# Patient Record
Sex: FEMALE | Race: WHITE
[De-identification: names, ages, dates, MRNs, and addresses within clinical notes are randomized per-mention and may not be internally consistent; named-entity substitution may affect disease eponyms.]

---

## 2018-10-16 ENCOUNTER — HOSPITAL ENCOUNTER (INPATIENT)
Dept: HOSPITAL 75 - ER | Age: 73
LOS: 1 days | Discharge: HOME | DRG: 310 | End: 2018-10-17
Attending: FAMILY MEDICINE | Admitting: FAMILY MEDICINE
Payer: MEDICARE

## 2018-10-16 VITALS — DIASTOLIC BLOOD PRESSURE: 93 MMHG | SYSTOLIC BLOOD PRESSURE: 127 MMHG

## 2018-10-16 VITALS — SYSTOLIC BLOOD PRESSURE: 134 MMHG | DIASTOLIC BLOOD PRESSURE: 95 MMHG

## 2018-10-16 VITALS — DIASTOLIC BLOOD PRESSURE: 59 MMHG | SYSTOLIC BLOOD PRESSURE: 110 MMHG

## 2018-10-16 VITALS — DIASTOLIC BLOOD PRESSURE: 93 MMHG | SYSTOLIC BLOOD PRESSURE: 125 MMHG

## 2018-10-16 VITALS — SYSTOLIC BLOOD PRESSURE: 127 MMHG | DIASTOLIC BLOOD PRESSURE: 80 MMHG

## 2018-10-16 VITALS — DIASTOLIC BLOOD PRESSURE: 82 MMHG | SYSTOLIC BLOOD PRESSURE: 120 MMHG

## 2018-10-16 VITALS — DIASTOLIC BLOOD PRESSURE: 69 MMHG | SYSTOLIC BLOOD PRESSURE: 109 MMHG

## 2018-10-16 VITALS — DIASTOLIC BLOOD PRESSURE: 96 MMHG | SYSTOLIC BLOOD PRESSURE: 135 MMHG

## 2018-10-16 VITALS — DIASTOLIC BLOOD PRESSURE: 86 MMHG | SYSTOLIC BLOOD PRESSURE: 122 MMHG

## 2018-10-16 VITALS — SYSTOLIC BLOOD PRESSURE: 119 MMHG | DIASTOLIC BLOOD PRESSURE: 74 MMHG

## 2018-10-16 VITALS — WEIGHT: 189.2 LBS | HEIGHT: 64 IN | BODY MASS INDEX: 32.3 KG/M2

## 2018-10-16 DIAGNOSIS — Z23: ICD-10-CM

## 2018-10-16 DIAGNOSIS — I48.0: Primary | ICD-10-CM

## 2018-10-16 DIAGNOSIS — Z82.49: ICD-10-CM

## 2018-10-16 DIAGNOSIS — R07.89: ICD-10-CM

## 2018-10-16 LAB
ALBUMIN SERPL-MCNC: 3.7 GM/DL (ref 3.2–4.5)
ALP SERPL-CCNC: 86 U/L (ref 40–136)
ALT SERPL-CCNC: 17 U/L (ref 0–55)
APTT BLD: 29 SEC (ref 24–35)
BASOPHILS # BLD AUTO: 0 10^3/UL (ref 0–0.1)
BASOPHILS NFR BLD AUTO: 0 % (ref 0–10)
BILIRUB SERPL-MCNC: 1 MG/DL (ref 0.1–1)
BUN/CREAT SERPL: 32
CALCIUM SERPL-MCNC: 9.6 MG/DL (ref 8.5–10.1)
CHLORIDE SERPL-SCNC: 112 MMOL/L (ref 98–107)
CO2 SERPL-SCNC: 18 MMOL/L (ref 21–32)
CREAT SERPL-MCNC: 0.78 MG/DL (ref 0.6–1.3)
EOSINOPHIL # BLD AUTO: 0.1 10^3/UL (ref 0–0.3)
EOSINOPHIL NFR BLD AUTO: 1 % (ref 0–10)
ERYTHROCYTE [DISTWIDTH] IN BLOOD BY AUTOMATED COUNT: 13.6 % (ref 10–14.5)
GFR SERPLBLD BASED ON 1.73 SQ M-ARVRAT: > 60 ML/MIN
GLUCOSE SERPL-MCNC: 107 MG/DL (ref 70–105)
HCT VFR BLD CALC: 40 % (ref 35–52)
HGB BLD-MCNC: 14 G/DL (ref 11.5–16)
INR PPP: 1 (ref 0.8–1.4)
LYMPHOCYTES # BLD AUTO: 2.1 X 10^3 (ref 1–4)
LYMPHOCYTES NFR BLD AUTO: 27 % (ref 12–44)
MAGNESIUM SERPL-MCNC: 2 MG/DL (ref 1.8–2.4)
MANUAL DIFFERENTIAL PERFORMED BLD QL: NO
MCH RBC QN AUTO: 32 PG (ref 25–34)
MCHC RBC AUTO-ENTMCNC: 35 G/DL (ref 32–36)
MCV RBC AUTO: 94 FL (ref 80–99)
MONOCYTES # BLD AUTO: 0.5 X 10^3 (ref 0–1)
MONOCYTES NFR BLD AUTO: 7 % (ref 0–12)
MYOGLOBIN SERPL-MCNC: 49 NG/ML (ref 10–92)
NEUTROPHILS # BLD AUTO: 4.8 X 10^3 (ref 1.8–7.8)
NEUTROPHILS NFR BLD AUTO: 64 % (ref 42–75)
PLATELET # BLD: 277 10^3/UL (ref 130–400)
PMV BLD AUTO: 9 FL (ref 7.4–10.4)
POTASSIUM SERPL-SCNC: 4 MMOL/L (ref 3.6–5)
PROT SERPL-MCNC: 6.4 GM/DL (ref 6.4–8.2)
PROTHROMBIN TIME: 13.5 SEC (ref 12.2–14.7)
RBC # BLD AUTO: 4.31 10^6/UL (ref 4.35–5.85)
SODIUM SERPL-SCNC: 143 MMOL/L (ref 135–145)
WBC # BLD AUTO: 7.5 10^3/UL (ref 4.3–11)

## 2018-10-16 PROCEDURE — 83735 ASSAY OF MAGNESIUM: CPT

## 2018-10-16 PROCEDURE — 93041 RHYTHM ECG TRACING: CPT

## 2018-10-16 PROCEDURE — 87081 CULTURE SCREEN ONLY: CPT

## 2018-10-16 PROCEDURE — 71045 X-RAY EXAM CHEST 1 VIEW: CPT

## 2018-10-16 PROCEDURE — 36415 COLL VENOUS BLD VENIPUNCTURE: CPT

## 2018-10-16 PROCEDURE — 96366 THER/PROPH/DIAG IV INF ADDON: CPT

## 2018-10-16 PROCEDURE — 93306 TTE W/DOPPLER COMPLETE: CPT

## 2018-10-16 PROCEDURE — 83874 ASSAY OF MYOGLOBIN: CPT

## 2018-10-16 PROCEDURE — 85610 PROTHROMBIN TIME: CPT

## 2018-10-16 PROCEDURE — 85025 COMPLETE CBC W/AUTO DIFF WBC: CPT

## 2018-10-16 PROCEDURE — 96365 THER/PROPH/DIAG IV INF INIT: CPT

## 2018-10-16 PROCEDURE — 93005 ELECTROCARDIOGRAM TRACING: CPT

## 2018-10-16 PROCEDURE — 90686 IIV4 VACC NO PRSV 0.5 ML IM: CPT

## 2018-10-16 PROCEDURE — 84484 ASSAY OF TROPONIN QUANT: CPT

## 2018-10-16 PROCEDURE — 85730 THROMBOPLASTIN TIME PARTIAL: CPT

## 2018-10-16 PROCEDURE — 84443 ASSAY THYROID STIM HORMONE: CPT

## 2018-10-16 PROCEDURE — 80061 LIPID PANEL: CPT

## 2018-10-16 PROCEDURE — 80053 COMPREHEN METABOLIC PANEL: CPT

## 2018-10-16 RX ADMIN — Medication SCH ML: at 20:42

## 2018-10-16 RX ADMIN — APIXABAN SCH MG: 5 TABLET, FILM COATED ORAL at 20:40

## 2018-10-16 RX ADMIN — Medication SCH ML: at 15:40

## 2018-10-16 NOTE — XMS REPORT
[facility]

 Created on: 2017



Aledo, Funmi

External Reference #: [id number]

: 1945

Sex: Female



Demographics







 Address  [address] [place]  [address]

 

 Preferred Language  Unknown

 

 Marital Status  Unknown

 

 [employer] Affiliation  Unknown

 

 Race  Unknown

 

 Ethnic Group  Unknown





Author







 Author  PERLITA IBARRA

 

 [facility]

 

 Address  [address] [place] [address]  [address]



 

 Phone  Unavailable







Care Team Providers







 Care Team Member Name  Role  Phone

 

 PERLITA IBARRA  Unavailable  Unavailable







PROBLEMS







 Type  Condition  ICD9-CM Code  [id number] Code  Onset Dates  Condition Status  
SNOMED Code

 

 Problem  Other and unspecified noninfectious gastroenteritis and colitis  
558.9        Active  27421134

 

 Problem  Unspecified conjunctivitis  372.30        Active  7736166

 

 Problem  Redness or discharge of eye  379.93        Active  47941673

 

 Assessment  Dental examination     Z01.20  24 Aug, 2016  Active  [id number]

 

 Problem  Nausea with vomiting  787.01        Active  61830134

 

 Problem  Blood in stool  578.1        Active  900069853

 

 Problem  Urinary tract infection, site not specified  599.0        Active  
67493888

 

 Problem  Cough  786.2        Active  75519029

 

 Problem  Personal history of colonic polyps  V12.72        Active  365494971

 

 Problem  Family history of malignant neoplasm of gastrointestinal tract  V16.0
        Active  914402811

 

 Problem  Elevated blood pressure reading without diagnosis of hypertension  
796.2        Active  834025930

 

 Problem  Actinic keratosis  702.0        Active  [id number]







ALLERGIES







 Substance  Reaction  Event Type  Date  Status

 

 N.K.D.A.  Unknown  Non Drug Allergy  24 Aug, 2016  Unknown







SOCIAL HISTORY

No smoking Hx information available



PLAN OF CARE





VITAL SIGNS







 Blood pressure systolic  145 mmHg  2016

 

 Blood pressure diastolic  85 mmHg  2016







MEDICATIONS

Unknown Medications



RESULTS

No Results



PROCEDURES







 Procedure  Date Ordered  Related Diagnosis  Body Site

 

 COMP ORAL EVALUATION - NEW/EST PT  Aug 24, 2016      

 

 INTRAORL-PERIAPICAL 1 FILM 62458  Aug 24, 2016      

 

 TOPICAL FLUORIDE VARNISH  Aug 24, 2016      

 

 PROPHYLAXIS - ADULT  Aug 24, 2016      

 

 INTRAORL-PERIAPICAL EA ADD FILM  Aug 24, 2016      

 

 INTRAORL-PERIAPICAL EA ADD FILM  Aug 24, 2016      

 

 PANORAMIC FILM SEE ALSO CODE 35265  Aug 24, 2016      

 

 BITEWINGS - FOUR FILMS  Aug 24, 2016      







IMMUNIZATIONS

No Known Immunizations

## 2018-10-16 NOTE — CONSULTATION-CARDIOLOGY
HPI-Cardiology


Cardiology Consultation:


Date of Consultation


10/16/18


Time Seen by a Provider:  12:30


Date of Admission


10-16-18


Attending Physician


Fifi River MD


Admitting Physician


No,Local Physician


Consulting Physician


Georgina Johnson MD





HPI:


Chief Complaint:


New onset a-fib with RVR


Ms. Loja is a 73 year old female admitted to ICU 10 from the ED with new 

onset a-fib with RVR





Review of Systems-Cardiology


Review of Systems


Constitutional:  No chills, No fever; tiredness


Eyes:  No vision change


Ears/Nose/Throat:  No recent hearing loss


Respiratory:  As described under HPI


Cardiovascular:  As described under HPI


Gastrointestinal:  abdomen distended; No constipation, No diarrhea; nausea; No 

vomiting


Genitourinary:  No dysuria, No hematuria


Skin:  No rash, No ulcerations


Psychiatric/Neurological:  No anxiety, No depression, No seizure, No focal 

weakness, No syncope


Hematologic:  No bleeding abnormalities





PMH-Social-Family Hx


Patient Social History


Alcohol Use:  Denies Use


Recreational Drug Use:  No


Smoking Status:  Never a Smoker


Recent Foreign Travel:  No


Recent Infectious Disease Expo:  No


Hospitalization with Isolation:  Denies





Past Medical History


PMH


As described under Assessment.





Allergies and Home Medications


Allergies


Coded Allergies:  


     No Known Drug Allergies (Unverified , 10/16/18)





Home Medications


Aspirin/Caffeine 1 Each Tablet, 2 TAB PO DAILY, (Reported)





Physical Exam-Cardiology


Physical Exam


Vital Signs/I&O











 10/16/18 10/16/18 10/16/18 10/16/18





 09:10 09:15 09:50 10:02


 


Pulse    113


 


Resp    18


 


B/P (MAP)   122/93 135/96 (109)


 


Pulse Ox    98


 


O2 Delivery  Room Air  Room Air





 10/16/18 10/16/18 10/16/18 10/16/18





 10:54 12:10 12:30 12:30


 


Temp    98.2


 


Pulse 100 93  103


 


Resp 18 18  14


 


B/P (MAP) 134/95 (108) 125/75 (92)  127/80 (96)


 


Pulse Ox 97 96  98


 


O2 Delivery Room Air Room Air Room Air Room Air


 


    





 10/16/18 10/16/18  





 13:00 13:00  


 


Pulse 92 105  


 


Resp  8  


 


B/P (MAP)  122/86 (98)  


 


Pulse Ox  98  


 


O2 Delivery  Room Air  





Capillary Refill : Less Than 3 Seconds





Data Review


Labs


Laboratory Tests


10/16/18 09:30: 


White Blood Count 7.5, Red Blood Count 4.31L, Hemoglobin 14.0, Hematocrit 40, 

Mean Corpuscular Volume 94, Mean Corpuscular Hemoglobin 32, Mean Corpuscular 

Hemoglobin Concent 35, Red Cell Distribution Width 13.6, Platelet Count 277, 

Mean Platelet Volume 9.0, Neutrophils (%) (Auto) 64, Lymphocytes (%) (Auto) 27, 

Monocytes (%) (Auto) 7, Eosinophils (%) (Auto) 1, Basophils (%) (Auto) 0, 

Neutrophils # (Auto) 4.8, Lymphocytes # (Auto) 2.1, Monocytes # (Auto) 0.5, 

Eosinophils # (Auto) 0.1, Basophils # (Auto) 0.0, Prothrombin Time 13.5, INR 

Comment 1.0, Activated Partial Thromboplast Time 29, Sodium Level 143, 

Potassium Level 4.0, Chloride Level 112H, Carbon Dioxide Level 18L, Anion Gap 13

, Blood Urea Nitrogen 25H, Creatinine 0.78, Estimat Glomerular Filtration Rate 

> 60, BUN/Creatinine Ratio 32, Glucose Level 107H, Calcium Level 9.6, Corrected 

Calcium 9.8, Magnesium Level 2.0, Total Bilirubin 1.0, Aspartate Amino Transf (

AST/SGOT) 17, Alanine Aminotransferase (ALT/SGPT) 17, Alkaline Phosphatase 86, 

Myoglobin 49.0, Troponin I < 0.30, Total Protein 6.4, Albumin 3.7, TSH Cascade 

Testing 1.55








Radiology


NAME:   MEHDI LOAJ


MED REC#:   [id number]


ACCOUNT#:   [id number]


PT STATUS:   REG ER


:   1945


PHYSICIAN:   CORDELIA GARCIA MD


ADMIT DATE:   10/16/18/ER


***Draft***


Date of Exam:10/16/18





CHEST 1 VIEW, AP/PA ONLY








Portable erect AP chest at 1019 hours.





INDICATION: Chest pain, shortness of breath.





There are no prior studies available for comparison.





FINDINGS: The heart size is at the upper limits of normal or


slightly enlarged. The lungs are clear. There is no sign of


failure, pneumonia or pleural effusion to indicate an acute


abnormality. The mediastinum is not widened but the superior


mediastinum on the right is somewhat prominent. This is more


likely due to vascular ectasia than to a mediastinal mass. If


previous exams are available, they would be helpful for


comparison. If there are no prior studies available, however,


then CT of the chest would be recommended.





The osseous structures are intact.





IMPRESSION:


1. There is borderline cardiomegaly, but there is no evidence for


an acute cardiopulmonary abnormality.


2. The prominence of superior mediastinum on the right is more


likely due to vascular ectasia than to a mediastinal mass.


Recommendations as above. 





  Dictated on workstation # [id number]








Dict:   10/16/18 1028


Trans:   10/16/18 1033


 [id number]





Interpreted by:     BETZAIDA GUILLEN MD


Electronically signed by:





ECG Impression


ECG


Initial ECG Impression:  Atrial Fibrillation w/RVR





A/P-Cardiology


Assessment/Admission Diagnosis


New onset a-fib with RVR (first dx today)





Clinical Quality Measures


AMI/AHF:


ASA po Prior to arrival:  Yes (BY [facility] )











SHAHLA SPAULDING Oct 16, 2018 12:30

## 2018-10-16 NOTE — ED CHEST PAIN
General


Chief Complaint:  Chest Pain


Stated Complaint:  CP


Nursing Triage Note:  


TO ED PER EMS FROM [facility] WITH CHEST PAIN AND SOA ON ADMIT MONITOR SHOW A FIB WITH 


RVR WITH - 160"S. PATIENT REPORTS  THAT HAS FELT SOA WITH CHEST PAIN ON 


AND OFF FOR LAST 2 DAYS


Nursing Sepsis Screen:  No Definite Risk


Source:  patient, EMS, other ([facility])


Exam Limitations:  no limitations





History of Present Illness


Date Seen by Provider:  Oct 16, 2018


Time Seen by Provider:  09:11


Initial Comments


This 73-year-old woman presents to emergency room with complaints of chest 

pain.  She arrives via EMS from [facility] clinic where she received aspirin 324 mg 

and a dose of nitroglycerin.  The nitroglycerin did not help.  EKG performed in 

the clinic showed atrial fibrillation with a normal rate.  She is in RVR for 

EMS.  Pain has improved since arriving to the hospital.  She has no prior 

history of atrial fibrillation or any cardiac history.  She woke this morning 

around 01:00 with the symptoms.  She felt particularly lightheaded and dizzy.  

She had pain in the sternal area and radiating up to the left jaw.  She recalls 

having a similar episode that was brief the night prior.





Allergies and Home Medications


Allergies


Coded Allergies:  


     No Known Drug Allergies (Unverified , 10/16/18)





Home Medications


Aspirin/Caffeine 1 Each Tablet, 2 TAB PO DAILY, (Reported)





Patient Home Medication List


Home Medication List Reviewed:  Yes





Review of Systems


Review of Systems


Constitutional:  no symptoms reported


EENTM:  No Symptoms Reported


Respiratory:  No Symptoms Reported


Cardiovascular:  See HPI


Gastrointestinal:  No Symptoms Reported


Genitourinary:  No Symptoms Reported


Musculoskeletal:  no symptoms reported


Skin:  no symptoms reported


Psychiatric/Neurological:  No Symptoms Reported


Endocrine:  No Symptoms Reported


Hematologic/Lymphatic:  No Symptoms Reported





Past Medical-Social-Family Hx


Past Med/Social Hx:  Reviewed and Corrections made


Patient Social History


Alcohol Use:  Denies Use


Recreational Drug Use:  No


Smoking Status:  Never a Smoker


Recent Foreign Travel:  No


Contact w/Someone Who Travel:  No


Recent Infectious Disease Expo:  No





Past Medical History


Surgeries:  Yes (anterior-posterior pelvic repair)


Hysterectomy


Respiratory:  No


Cardiac:  No


Neurological:  No


GYN History:  Hysterectomy


Genitourinary:  No


Gastrointestinal:  No


Musculoskeletal:  No


Endocrine:  No


HEENT:  No


Cancer:  No


Psychosocial:  No


Integumentary:  No





Physical Exam


Vital Signs





Vital Signs - First Documented








 10/16/18 10/16/18 10/16/18





 09:15 09:50 10:02


 


Pulse   113


 


Resp   18


 


B/P (MAP)  122/93 


 


Pulse Ox   98


 


O2 Delivery Room Air  





Capillary Refill : Less Than 3 Seconds


Height, Weight, BMI


Height: 5'4.00"


Weight: 200lbs. oz. 90.265614ce;  BMI


Method:Stated


General Appearance:  No Apparent Distress, WD/WN


HEENT:  PERRL/EOMI, Normal ENT Inspection, Pharynx Normal


Neck:  Normal Inspection


Respiratory:  Lungs Clear, Normal Breath Sounds, No Accessory Muscle Use, No 

Respiratory Distress


Cardiovascular:  No Edema, No Murmur, Irregularly Irregular, Tachycardia


Gastrointestinal:  Normal Bowel Sounds, Non Tender, Soft


Extremity:  Normal Inspection, Non Tender, No Pedal Edema


Neurologic/Psychiatric:  Alert, Oriented x3, No Motor/Sensory Deficits, Normal 

Mood/Affect, CNs II-XII Norm as Tested


Skin:  Normal Color, Warm/Dry





Progress/Results/Core Measures


Results/Orders


Lab Results





Laboratory Tests








Test


 10/16/18


09:30 Range/Units


 


 


White Blood Count


 7.5 


 4.3-11.0


10^3/uL


 


Red Blood Count


 4.31 L


 4.35-5.85


10^6/uL


 


Hemoglobin 14.0  11.5-16.0  G/DL


 


Hematocrit 40  35-52  %


 


Mean Corpuscular Volume 94  80-99  FL


 


Mean Corpuscular Hemoglobin 32  25-34  PG


 


Mean Corpuscular Hemoglobin


Concent 35 


 32-36  G/DL





 


Red Cell Distribution Width 13.6  10.0-14.5  %


 


Platelet Count


 277 


 130-400


10^3/uL


 


Mean Platelet Volume 9.0  7.4-10.4  FL


 


Neutrophils (%) (Auto) 64  42-75  %


 


Lymphocytes (%) (Auto) 27  12-44  %


 


Monocytes (%) (Auto) 7  0-12  %


 


Eosinophils (%) (Auto) 1  0-10  %


 


Basophils (%) (Auto) 0  0-10  %


 


Neutrophils # (Auto) 4.8  1.8-7.8  X 10^3


 


Lymphocytes # (Auto) 2.1  1.0-4.0  X 10^3


 


Monocytes # (Auto) 0.5  0.0-1.0  X 10^3


 


Eosinophils # (Auto)


 0.1 


 0.0-0.3


10^3/uL


 


Basophils # (Auto)


 0.0 


 0.0-0.1


10^3/uL


 


Prothrombin Time 13.5  12.2-14.7  SEC


 


INR Comment 1.0  0.8-1.4  


 


Activated Partial


Thromboplast Time 29 


 24-35  SEC





 


Sodium Level 143  135-145  MMOL/L


 


Potassium Level 4.0  3.6-5.0  MMOL/L


 


Chloride Level 112 H   MMOL/L


 


Carbon Dioxide Level 18 L 21-32  MMOL/L


 


Anion Gap 13  5-14  MMOL/L


 


Blood Urea Nitrogen 25 H 7-18  MG/DL


 


Creatinine


 0.78 


 0.60-1.30


MG/DL


 


Estimat Glomerular Filtration


Rate > 60 


  





 


BUN/Creatinine Ratio 32   


 


Glucose Level 107 H   MG/DL


 


Calcium Level 9.6  8.5-10.1  MG/DL


 


Corrected Calcium 9.8  8.5-10.1  MG/DL


 


Magnesium Level 2.0  1.8-2.4  MG/DL


 


Total Bilirubin 1.0  0.1-1.0  MG/DL


 


Aspartate Amino Transf


(AST/SGOT) 17 


 5-34  U/L





 


Alanine Aminotransferase


(ALT/SGPT) 17 


 0-55  U/L





 


Alkaline Phosphatase 86    U/L


 


Myoglobin


 49.0 


 10.0-92.0


NG/ML


 


Troponin I < 0.30  <0.30  NG/ML


 


Total Protein 6.4  6.4-8.2  GM/DL


 


Albumin 3.7  3.2-4.5  GM/DL


 


TSH Cascade Testing


 1.55 


 0.35-4.94


UIU/ML








My Orders





Orders - CORDELIA GARCIA MD


Ekg Tracing (10/16/18 09:11)


Cbc With Automated Diff (10/16/18 09:)


Magnesium (10/16/18 09:)


Chest 1 View, Ap/Pa Only (10/16/18 09:)


Cardiac Profile 1 (10/16/18 09:)


Comprehensive Metabolic Panel (10/16/18 09:)


Myoglobin Serum (10/16/18 09:)


Protime With Inr (10/16/18 09:)


Partial Thromboplastin Time (10/16/18 09:)


O2 (10/16/18 09:)


Monitor-Rhythm Ecg Trace Only (10/16/18 09:)


Lipid Panel (10/17/18 06:00)


Saline Lock/Iv-Start (10/16/18 09:17)


Thyroid Analyzer (10/16/18 09:17)


Diltiazem Injection (Cardizem Injection) (10/16/18 09:45)


Ns (Ivpb) (Sodium C... W/Diltiazem Iv Fo (10/16/18 09:45)


Apixaban Tablet (Eliquis Tablet) (10/16/18 11:45)





Medications Given in ED





Current Medications








 Medications  Dose


 Ordered  Sig/Ling


 Route  Start Time


 Stop Time Status Last Admin


Dose Admin


 


 Diltiazem HCl  10 mg  ONCE  ONCE


 IVP  10/16/18 09:45


 10/16/18 09:46 DC 10/16/18 09:39


10 MG








Vital Signs/I&O











 10/16/18 10/16/18 10/16/18 10/16/18





 09:10 09:15 09:50 10:02


 


Pulse    113


 


Resp    18


 


B/P (MAP)   122/93 135/96 (109)


 


Pulse Ox    98


 


O2 Delivery  Room Air  Room Air





 10/16/18   





 10:54   


 


Pulse 100   


 


Resp 18   


 


B/P (MAP) 134/95 (108)   


 


Pulse Ox 97   


 


O2 Delivery Room Air   














Blood Pressure Mean:  109











Progress


Progress Note :  


Progress Note


Patient had much improved vital signs on her Cardizem drip.  She was feeling 

much improved as well.  Workup was unremarkable.  Eliquis was initiated in the 

ER per Dr. Johnson's recommendation.


Initial ECG Impression Date:  Oct 16, 2018


Initial ECG Impression Time:  09:13


Initial ECG Rate:  174


Initial ECG Rhythm:  A Fib/Flutter


Initial ECG Impression:  Atrial Fibrillation w/RVR


Comment


Atrial fibrillation with repolarization abnormalities.





Diagnostic Imaging





   Diagonstic Imaging:  Xray


   Plain Films/CT/US/NM/MRI:  chest


Comments


Chest x-ray viewed by me and report reviewed.  See report below:





NAME:   MEHDI WAHL


MED REC#:   [id number]


ACCOUNT#:   [id number]


PT STATUS:   REG ER


:   1945


PHYSICIAN:   CORDELIA GARCIA MD


ADMIT DATE:   10/16/18/ER


***Draft***


Date of Exam:10/16/18





CHEST 1 VIEW, AP/PA ONLY





Portable erect AP chest at 1019 hours.





INDICATION: Chest pain, shortness of breath.





There are no prior studies available for comparison.





FINDINGS: The heart size is at the upper limits of normal or


slightly enlarged. The lungs are clear. There is no sign of


failure, pneumonia or pleural effusion to indicate an acute


abnormality. The mediastinum is not widened but the superior


mediastinum on the right is somewhat prominent. This is more


likely due to vascular ectasia than to a mediastinal mass. If


previous exams are available, they would be helpful for


comparison. If there are no prior studies available, however,


then CT of the chest would be recommended.





The osseous structures are intact.





IMPRESSION:


1. There is borderline cardiomegaly, but there is no evidence for


an acute cardiopulmonary abnormality.


2. The prominence of superior mediastinum on the right is more


likely due to vascular ectasia than to a mediastinal mass.


Recommendations as above. 





  Dictated on workstation # [id number]





Dict:   10/16/18 1028


Trans:   10/16/18 1033


 [id number]





Interpreted by:     BETZAIDA GUILLEN MD





Departure


Communication (Admissions)


Time/Spoke to Admitting Phy:  11:25


Dr. River


Time/Spoke to Consulting Phy:  11:18


Dr. Johnson





Impression





 Primary Impression:  


 Atrial fibrillation with RVR


 Additional Impressions:  


 New onset a-fib


 Chest pain


 Qualified Codes:  R07.9 - Chest pain, unspecified


Disposition:   ADMITTED AS INPATIENT


Condition:  Improved





Admissions


Decision to Admit Reason:  Admit from ER (General)


Decision to Admit/Date:  Oct 16, 2018


Time/Decision to Admit Time:  09:11











CORDELIA GARCIA MD Oct 16, 2018 10:18

## 2018-10-16 NOTE — XMS REPORT
Continuity of Care Document

 Created on: 10/16/2018



[facility]

External Reference #: [id number]

: 1945

Sex: Female



Demographics







 Address  [address]

[place]  [address]

 

 Home Phone  [phone] x

 

 Preferred Language  Unknown

 

 Marital Status  Unknown

 

 Cheondoism Affiliation  Unknown

 

 Race  Unknown

 

 Ethnic Group  Unknown





Author







 Author  [facility]

 

 Address  Unknown

 

 Phone  Unavailable



              



Allergies

      



There is no data.                  



Medications

      



There is no data.                  



Problems

      





 Date Dx Coded            Attending            Type            Code            
Diagnosis            Diagnosed By        

 

 2009                                      401.1            HYPERTENSION, 
BENIGN ESSENTIAL                     

 

 2009                                      401.1            HYPERTENSION, 
BENIGN ESSENTIAL                     

 

 2009            ADAM CRENSHAW DO K                         401.1          
  HYPERTENSION, BENIGN ESSENTIAL                     

 

 2009            ADAM CRENSHAW DO K                         401.1          
  HYPERTENSION, BENIGN ESSENTIAL                     

 

 2009                                      845.00            ANKLE SPRAIN
                     

 

 2009                                      845.00            ANKLE SPRAIN
                     

 

 2009            JANNA CRENSHAW DOA K                         845.00         
   ANKLE SPRAIN                     

 

 2009            JANNA CRENSHAW DOA K                         845.00         
   ANKLE SPRAIN                     

 

 2010                                      466.0            ACUTE 
BRONCHITIS                     

 

 2010                                      477.9            ALLERGIC 
RHINITIS                     

 

 2010                                      786.2            cough        
             

 

 2010                                      466.0            ACUTE 
BRONCHITIS                     

 

 2010                                      477.9            ALLERGIC 
RHINITIS                     

 

 2010                                      786.2            cough        
             

 

 2010            JANNA CRENSHAW DOA K                         466.0          
  ACUTE BRONCHITIS                     

 

 2010            FLOWER AMADOR ADAM K                         477.9          
  ALLERGIC RHINITIS                     

 

 2010            JANNA CRENSHAW DOA K                         786.2          
  cough                     

 

 2010            JANNA CRENSHAW DOA K                         466.0          
  ACUTE BRONCHITIS                     

 

 2010            FLOWER AMADOR ADAM K                         477.9          
  ALLERGIC RHINITIS                     

 

 2010            FLOWER AMADOR ADAM K                         786.2          
  cough                     

 

 2010                                      461.8            OTHER ACUTE 
SINUSITIS                     

 

 2010                                      461.8            OTHER ACUTE 
SINUSITIS                     

 

 2010            ADAM CRENSHAW DO K                         461.8          
  OTHER ACUTE SINUSITIS                     

 

 2010            FLOWER AMADOR ADAM K                         461.8          
  OTHER ACUTE SINUSITIS                     

 

 2010                                      599.0            URINARY TRACT 
INFECTION                     

 

 2010                                      788.41            URINARY 
FREQUENCY                     

 

 2010                                      788.63            URINARY 
URGENCY                     

 

 2010                                      599.0            URINARY TRACT 
INFECTION                     

 

 2010                                      788.41            URINARY 
FREQUENCY                     

 

 2010                                      788.63            URINARY 
URGENCY                     

 

 2010            CRENSHAW DO, ADAM K                         599.0          
  URINARY TRACT INFECTION                     

 

 2010            CRENSHAW DO, ADAM K                         788.41         
   URINARY FREQUENCY                     

 

 2010            CRENSHAW DO, ADAM K                         788.63         
   URINARY URGENCY                     

 

 2010            CRENSHAW DO, ADAM K                         599.0          
  URINARY TRACT INFECTION                     

 

 2010            CRENSHAW DO, ADAM K                         788.41         
   URINARY FREQUENCY                     

 

 2010            CRENSHAW DO, ADAM K                         788.63         
   URINARY URGENCY                     

 

 2011                                      V06.5            TD DX        
             

 

 2011                                      V06.5            TD DX        
             

 

 2011            CRENSHAW DO, ADAM K                         V06.5          
  TD DX                     

 

 2011            CRENSHAW DO, ADAM K                         V06.5          
  TD DX                     

 

 10/19/2011                                      272.4            
HYPERLIPIDEMIA                     

 

 10/19/2011                                      272.4            
HYPERLIPIDEMIA                     

 

 10/19/2011            CRENSHAW DO, ADAM K                         272.4          
  HYPERLIPIDEMIA                     

 

 10/19/2011            CRENSHAW DO, ADAM K                         272.4          
  HYPERLIPIDEMIA                     

 

 2012                                      599.0            URINARY TRACT 
INFECTION                     

 

 2012                                      786.2            COUGH        
             

 

 2012                                      599.0            URINARY TRACT 
INFECTION                     

 

 2012                                      786.2            COUGH        
             

 

 2012            CRENSHAW DO, ADAM K                         599.0          
  URINARY TRACT INFECTION                     

 

 2012            CRENSHAW DO, ADAM K                         786.2          
  COUGH                     

 

 2012            CRENSHAW DO, ADAM K                         599.0          
  URINARY TRACT INFECTION                     

 

 2012            CRENSHAW DO, ADAM K                         786.2          
  COUGH                     

 

 2012                                      372.30            
CONJUNCTIVITIS UNSPECIFIED                     

 

 2012                                      379.93            REDNESS OR 
DISCHARGE OF EYE                     

 

 2012                                      372.30            
CONJUNCTIVITIS UNSPECIFIED                     

 

 2012                                      379.93            REDNESS OR 
DISCHARGE OF EYE                     

 

 2012            CRENSHAW DO, ADAM K                         372.30         
   CONJUNCTIVITIS UNSPECIFIED                     

 

 2012            CRENSHAW DO, ADAM K                         379.93         
   REDNESS OR DISCHARGE OF EYE                     

 

 2012            CRENSHAW DO, ADAM K                         372.30         
   CONJUNCTIVITIS UNSPECIFIED                     

 

 2012            CRENSHAW DO, ADAM K                         379.93         
   REDNESS OR DISCHARGE OF EYE                     

 

 2013                                      558.9            
GASTROENTERITIS NONINFECTIOUS                     

 

 2013                                      578.1            HEMATOCHEZIA 
                    

 

 2013                                      787.01            NAUSEA WITH 
VOMITING                     

 

 2013            ADAM CRENSHAW DO                         558.9          
  GASTROENTERITIS NONINFECTIOUS                     

 

 2013            ADAM CRENSHAW DO                         578.1          
  HEMATOCHEZIA                     

 

 2013            ADAM CRENSHAW DO                         787.01         
   NAUSEA WITH VOMITING                     

 

 2013            ADAM CRENSHAW DO                         558.9          
  GASTROENTERITIS NONINFECTIOUS                     

 

 2013            ADAM CRENSHAW DO                         578.1          
  HEMATOCHEZIA                     

 

 2013            ADAM CRENSHAW DO                         787.01         
   NAUSEA WITH VOMITING                     

 

 2014            ADAM CRENSHAW DO                         796.2          
  ELEVATED BLOOD PRESSURE READING WITHOUT DIAGNOSIS OF HYPERTENSION            
         

 

 2014            ADAM CRENSHAW DO                         796.2          
  ELEVATED BLOOD PRESSURE READING WITHOUT DIAGNOSIS OF HYPERTENSION            
         

 

 2015            ADAM CRENSHAW DO                         702.0          
  ACTINIC KERATOSIS                     

 

 2015            ADAM CRENSHAW DO                         V12.72         
   PERSONAL HISTORY OF COLONIC POLYPS                     

 

 2015            ADAM CRENSHAW DO                         V16.0          
  FAMILY HISTORY OF MALIGNANT NEOPLASM OF GASTROINTESTINAL TRACT               
      



                                                                               
                                                                               
      



Procedures

      





 Code            Description            Performed By            Performed On   
     

 

             20722                                  ROUTINE VENIPUNCTURE       
                            2013        

 

             75811                                  BMP                        
           2013        

 

             11551                                  CBC                        
           2013        

 

             2000F                                  BLOOD PRESSURE CHECK       
                            2014        

 

             19907                                  ROUTINE VENIPUNCTURE       
                            2015        

 

             00841                                  CBC                        
           2015        

 

             2270381                                  GFR CALC (RESULT ONLY)   
                                2015        

 

             40747                                  CMP                        
           2015        

 

             73046                                  LIPID PANEL                
                   2015        

 

             91168                                  TSH                        
           2015        



                                    



Results

      





 Test            Result            Range        









 CULTURE, URINE - 17 12:11         









 Urine Culture, Routine            Final report             NRG        

 

 Result 1            Escherichia coli             NRG        

 

 Antimicrobial Susceptibility                         NRG        









 Urine Culture, Routine - 17 12:11         









 Urine Culture, Routine            Note                      



                  



Encounters

      





 ACCT No.            Visit Date/Time            Discharge            Status    
        Pt. Type            Provider            Facility            Loc./Unit  
          Complaint        

 

 [id number]            2015 08:15:00            2015 23:59:59          
  [facility]            Outpatient            ADAM CRENSHAW DO                        
                       

 

 [id number]            2014 13:21:00            2014 23:59:59          
  [facility]            Outpatient            ADAM CRENSHAW DO                        
                       

 

 [id number]            2012 18:26:00            2012 23:59:59          
  [facility]            Outpatient                                                    
        

 

 [id number]            2013 15:44:00                                      
Document Registration                                                          
  

 

 [id number]            2017 11:40:00            2017 23:59:59            
[facility]            Outpatient            AIMEE REED LAC                         
[facility]                     

 

 [id number]            2017 11:40:00                                      
Document Registration                                                          
  

 

 [id number]            2017 19:06:00                                   
   Document Registration

## 2018-10-16 NOTE — XMS REPORT
[facility]

 Created on: 2018



[employer]

External Reference #: [id number]

: 1945

Sex: Female



Demographics







 Address  [address] [place]  [address]

 

 Preferred Language  Unknown

 

 Marital Status  Unknown

 

 Muslim Affiliation  Unknown

 

 Race  Unknown

 

 Ethnic Group  Unknown





Author







 Author  MILANA ALTAMIRANO

 

 [facility] WALK IN [facility]

 

 Address  [address] [place]  [address]



 

 Phone  [phone]







Care Team Providers







 Care Team Member Name  Role  Phone

 

 EVELIA  MILANA  Unavailable  [phone]







PROBLEMS







 Type  Condition  ICD9-CM Code  TUE34-QL Code  Onset Dates  Condition Status  
SNOMED Code

 

 Problem  Elevated blood pressure reading without diagnosis of hypertension  
796.2        Active  348806447

 

 Problem  Cough  786.2        Active  54439624

 

 Problem  Nausea with vomiting  787.01        Active  05897065

 

 Problem  Family history of malignant neoplasm of gastrointestinal tract  V16.0
        Active  270666441

 

 Problem  Personal history of colonic polyps  V12.72        Active  767999791

 

 Problem  Unspecified conjunctivitis  372.30        Active  9223354

 

 Problem  Redness or discharge of eye  379.93        Active  56941869

 

 Problem  Urinary tract infection, site not specified  599.0        Active  
91113290

 

 Problem  Actinic keratosis  702.0        Active  314556837

 

 Problem  Other and unspecified noninfectious gastroenteritis and colitis  
558.9        Active  44458719

 

 Problem  Blood in stool  578.1        Active  943472580







ALLERGIES

No Known Allergies



ENCOUNTERS







 Encounter  Location  Date  Diagnosis

 

 [facility]  [id number] [place] [address]
-0026  20 Sep, 2017  Dysuria R30.0 and Bronchitis J40

 

 [facility] WALK IN [facility]  [id number] [place] [address]
-[phone]    Acute allergic rhinitis due to pollen, unspecified 
seasonality J30.1

 

 [facility]  [address] 
[id number]    Dental examination Z01.20

 

 [facility]  [id number] [place] [address] 
[id number]    Dental examination Z01.20

 

 [facility]  [address] 
[id number]  13 Oct, 2016  Dental examination Z01.20

 

 [facility]  [address] [id number] [place] 
[id number]  24 Aug, 2016  Dental examination Z01.20

 

 [facility]  [address] [id number] [place] [address]  14 2015   

 

 [facility]  [address] [id number] [place] [address]     

 

 [facility]  [address] [id number] [place] [address]
2546  17 Mar, 2015   

 

 [facility]  [address] [id number] [place] [address]-
3187  17 Mar, 2015   

 

 [facility]  [address] [id number] [place] [address]-
[address]  05 May, 2014   

 

 [facility]  [address] [id number] [place] [address]-
[address]  05 May, 2014   

 

 [facility]  [address] [id number] [place] [address]-
8[address]  21 Aug, 2013   

 

 [facility]  [address] [id number] [place] [address]-
1827  20 Aug, 2013   

 

 [facility]  [address] [id number] [place] [address]-
38[address]  19 Aug, 2013   

 

 [facility]  [address] [id number] [place] [address]-
1514  18 Dec, 2012   

 

 [facility] [place]  [address] [id number], [place] [address]
2406  18 Dec, 2012   

 

 [facility]  [address] [id number] [place] [address]-
[address]     

 

 [facility]  [address] [id number] [place] [address]
2546  05 Mar, 2012   

 

 [facility] [place]  [address] [id number], [place] [address]-
2956  03 Mar, 2012   

 

 [facility]  [address] [id number] [address]
2546  01 Mar, 2012   

 

 [facility]  [address] [id number] [address]     

 

 [facility]  [id number], [place] [address]
2546     

 

 [facility]  [id number] [address]-
2546     

 

 [facility]  [id number], [place] [address]
2582  19 Oct, 2011   

 

 [facility]  [id number] [address]
2546  19 Oct, 2011   

 

 [facility]  [id number], [place] [address]
2546  19 Oct, 2011   

 

 [facility]  [address] [id number] [address]
2546  11 Sep, 2009   







IMMUNIZATIONS

No Known Immunizations



SOCIAL HISTORY

Never Assessed



REASON FOR VISIT

Nausea/cough, rib pain started about 1 week ago [id number]



PLAN OF CARE







 Activity  Details









  









 Follow Up  prn Reason:







VITAL SIGNS







 Height  64 in  2017

 

 Weight  194.8 lbs  2017

 

 Temperature  97.2 degrees Fahrenheit  2017

 

 Heart Rate  60 bpm  2017

 

 Respiratory Rate  22   2017

 

 BMI  33.43 kg/m2  2017

 

 Blood pressure systolic  124 mmHg  2017

 

 Blood pressure diastolic  88 mmHg  2017







MEDICATIONS







 Medication  Instructions  Dosage  Frequency  Start Date  End Date  Duration  
Status

 

 Ibuprofen                    Active







RESULTS

No Results



PROCEDURES







 Procedure  Date Ordered  Result  Body Site

 

 [facility] VISIT ESTABLISHED PATIENT  2017      







INSTRUCTIONS





MEDICATIONS ADMINISTERED

No Known Medications



MEDICAL (GENERAL) HISTORY







 Type  Description  Date

 

 Medical History  hbp in the past   

 

 Surgical History  hysterectomy, total with bilateral salpingo-oophorectomy (BSO
)

## 2018-10-16 NOTE — DIAGNOSTIC IMAGING REPORT
Portable erect AP chest at 1019 hours.



INDICATION: Chest pain, shortness of breath.



There are no prior studies available for comparison.



FINDINGS: The heart size is at the upper limits of normal or

slightly enlarged. The lungs are clear. There is no sign of

failure, pneumonia or pleural effusion to indicate an acute

abnormality. The mediastinum is not widened but the superior

mediastinum on the right is somewhat prominent. This is more

likely due to vascular ectasia than to a mediastinal mass. If

previous exams are available, they would be helpful for

comparison. If there are no prior studies available, however,

then CT of the chest would be recommended.



The osseous structures are intact.



IMPRESSION:

1. There is borderline cardiomegaly, but there is no evidence for

an acute cardiopulmonary abnormality.

2. The prominence of superior mediastinum on the right is more

likely due to vascular ectasia than to a mediastinal mass.

Recommendations as above. 



Dictated by: 



  Dictated on workstation # [id number]

## 2018-10-16 NOTE — XMS REPORT
Continuity of Care Document

 Created on: 10/16/2018



MEHDI WAHL

External Reference #: [id number]

: 1945

Sex: Female



Demographics







 Address  [address]

[place]  [address]

 

 Home Phone  [phone] x

 

 Preferred Language  Unknown

 

 Marital Status  Unknown

 

 Anabaptist Affiliation  Unknown

 

 Race  Unknown

 

 Ethnic Group  Unknown





Author







 Author  [facility]

 

 Address  Unknown

 

 Phone  Unavailable



              



Allergies

      



There is no data.                  



Medications

      



There is no data.                  



Problems

      





 Date Dx Coded            Attending            Type            Code            
Diagnosis            Diagnosed By        

 

 2009                                      401.1            HYPERTENSION, 
BENIGN ESSENTIAL                     

 

 2009                                      401.1            HYPERTENSION, 
BENIGN ESSENTIAL                     

 

 2009            ADAM CRENSHAW DO K                         401.1          
  HYPERTENSION, BENIGN ESSENTIAL                     

 

 2009            ADAM CRENSHAW DO K                         401.1          
  HYPERTENSION, BENIGN ESSENTIAL                     

 

 2009                                      845.00            ANKLE SPRAIN
                     

 

 2009                                      845.00            ANKLE SPRAIN
                     

 

 2009            JANNA CRENSHAW DOA K                         845.00         
   ANKLE SPRAIN                     

 

 2009            JANNA CRENSHAW DOA K                         845.00         
   ANKLE SPRAIN                     

 

 2010                                      466.0            ACUTE 
BRONCHITIS                     

 

 2010                                      477.9            ALLERGIC 
RHINITIS                     

 

 2010                                      786.2            cough        
             

 

 2010                                      466.0            ACUTE 
BRONCHITIS                     

 

 2010                                      477.9            ALLERGIC 
RHINITIS                     

 

 2010                                      786.2            cough        
             

 

 2010            JANNA CRENSHAW DOA K                         466.0          
  ACUTE BRONCHITIS                     

 

 2010            FLOWER AMADOR ADAM K                         477.9          
  ALLERGIC RHINITIS                     

 

 2010            JANNA CRENSHAW DOA K                         786.2          
  cough                     

 

 2010            JANNA RCENSHAW DOA K                         466.0          
  ACUTE BRONCHITIS                     

 

 2010            FLOWER AMADOR ADAM K                         477.9          
  ALLERGIC RHINITIS                     

 

 2010            FLOWER AMADRO ADAM K                         786.2          
  cough                     

 

 2010                                      461.8            OTHER ACUTE 
SINUSITIS                     

 

 2010                                      461.8            OTHER ACUTE 
SINUSITIS                     

 

 2010            ADAM CRENSHAW DO K                         461.8          
  OTHER ACUTE SINUSITIS                     

 

 2010            FLOWER AMADOR ADAM K                         461.8          
  OTHER ACUTE SINUSITIS                     

 

 2010                                      599.0            URINARY TRACT 
INFECTION                     

 

 2010                                      788.41            URINARY 
FREQUENCY                     

 

 2010                                      788.63            URINARY 
URGENCY                     

 

 2010                                      599.0            URINARY TRACT 
INFECTION                     

 

 2010                                      788.41            URINARY 
FREQUENCY                     

 

 2010                                      788.63            URINARY 
URGENCY                     

 

 2010            CRENSHAW DO, ADAM K                         599.0          
  URINARY TRACT INFECTION                     

 

 2010            CRENSHAW DO, ADAM K                         788.41         
   URINARY FREQUENCY                     

 

 2010            CRENSHAW DO, ADAM K                         788.63         
   URINARY URGENCY                     

 

 2010            CRENSHAW DO, ADAM K                         599.0          
  URINARY TRACT INFECTION                     

 

 2010            CRENSHAW DO, ADAM K                         788.41         
   URINARY FREQUENCY                     

 

 2010            CRENSHAW DO, ADAM K                         788.63         
   URINARY URGENCY                     

 

 2011                                      V06.5            TD DX        
             

 

 2011                                      V06.5            TD DX        
             

 

 2011            CRENSHAW DO, ADAM K                         V06.5          
  TD DX                     

 

 2011            CRENSHAW DO, ADAM K                         V06.5          
  TD DX                     

 

 10/19/2011                                      272.4            
HYPERLIPIDEMIA                     

 

 10/19/2011                                      272.4            
HYPERLIPIDEMIA                     

 

 10/19/2011            CRENSHAW DO, ADAM K                         272.4          
  HYPERLIPIDEMIA                     

 

 10/19/2011            CRENSHAW DO, ADAM K                         272.4          
  HYPERLIPIDEMIA                     

 

 2012                                      599.0            URINARY TRACT 
INFECTION                     

 

 2012                                      786.2            COUGH        
             

 

 2012                                      599.0            URINARY TRACT 
INFECTION                     

 

 2012                                      786.2            COUGH        
             

 

 2012            CRENSHAW DO, ADAM K                         599.0          
  URINARY TRACT INFECTION                     

 

 2012            CRENSHAW DO, ADAM K                         786.2          
  COUGH                     

 

 2012            CRENSHAW DO, ADAM K                         599.0          
  URINARY TRACT INFECTION                     

 

 2012            CRENSHAW DO, ADAM K                         786.2          
  COUGH                     

 

 2012                                      372.30            
CONJUNCTIVITIS UNSPECIFIED                     

 

 2012                                      379.93            REDNESS OR 
DISCHARGE OF EYE                     

 

 2012                                      372.30            
CONJUNCTIVITIS UNSPECIFIED                     

 

 2012                                      379.93            REDNESS OR 
DISCHARGE OF EYE                     

 

 2012            CRENSHAW DO, ADAM K                         372.30         
   CONJUNCTIVITIS UNSPECIFIED                     

 

 2012            CRENSHAW DO, ADAM K                         379.93         
   REDNESS OR DISCHARGE OF EYE                     

 

 2012            CRENSHAW DO, ADAM K                         372.30         
   CONJUNCTIVITIS UNSPECIFIED                     

 

 2012            CRENSHAW DO, ADAM K                         379.93         
   REDNESS OR DISCHARGE OF EYE                     

 

 2013                                      558.9            
GASTROENTERITIS NONINFECTIOUS                     

 

 2013                                      578.1            HEMATOCHEZIA 
                    

 

 2013                                      787.01            NAUSEA WITH 
VOMITING                     

 

 2013            ADAM CRENSHAW DO                         558.9          
  GASTROENTERITIS NONINFECTIOUS                     

 

 2013            ADAM CRENSHAW DO                         578.1          
  HEMATOCHEZIA                     

 

 2013            ADAM CRENSHAW DO                         787.01         
   NAUSEA WITH VOMITING                     

 

 2013            ADAM CRENSHAW DO                         558.9          
  GASTROENTERITIS NONINFECTIOUS                     

 

 2013            ADAM CRENSHAW DO                         578.1          
  HEMATOCHEZIA                     

 

 2013            ADAM CRENSHAW DO                         787.01         
   NAUSEA WITH VOMITING                     

 

 2014            ADAM CRENSHAW DO                         796.2          
  ELEVATED BLOOD PRESSURE READING WITHOUT DIAGNOSIS OF HYPERTENSION            
         

 

 2014            ADAM CRENSHAW DO                         796.2          
  ELEVATED BLOOD PRESSURE READING WITHOUT DIAGNOSIS OF HYPERTENSION            
         

 

 2015            ADAM CRENSHAW DO                         702.0          
  ACTINIC KERATOSIS                     

 

 2015            ADAM CRENSHAW DO                         V12.72         
   PERSONAL HISTORY OF COLONIC POLYPS                     

 

 2015            ADAM CRENSHAW DO                         V16.0          
  FAMILY HISTORY OF MALIGNANT NEOPLASM OF GASTROINTESTINAL TRACT               
      



                                                                               
                                                                               
      



Procedures

      





 Code            Description            Performed By            Performed On   
     

 

             67470                                  ROUTINE VENIPUNCTURE       
                            2013        

 

             15762                                  BMP                        
           2013        

 

             61684                                  CBC                        
           2013        

 

             2000F                                  BLOOD PRESSURE CHECK       
                            2014        

 

             11739                                  ROUTINE VENIPUNCTURE       
                            2015        

 

             39021                                  CBC                        
           2015        

 

             3405498                                  GFR CALC (RESULT ONLY)   
                                2015        

 

             07876                                  CMP                        
           2015        

 

             88441                                  LIPID PANEL                
                   2015        

 

             56431                                  TSH                        
           2015        



                                    



Results

      





 Test            Result            Range        









 CULTURE, URINE - 17 12:11         









 Urine Culture, Routine            Final report             NRG        

 

 Result 1            Escherichia coli             NRG        

 

 Antimicrobial Susceptibility                         NRG        









 Urine Culture, Routine - 17 12:11         









 Urine Culture, Routine            Note                      



                  



Encounters

      





 ACCT No.            Visit Date/Time            Discharge            Status    
        Pt. Type            Provider            Facility            Loc./Unit  
          Complaint        

 

 [id number]            2015 08:15:00            2015 23:59:59          
  [facility]            Outpatient            ADAM CRENSHAW DO                        
                       

 

 [id number]            2014 13:21:00            2014 23:59:59          
  [facility]            Outpatient            ADAM CRENSHAW DO                        
                       

 

 [id number]            2012 18:26:00            2012 23:59:59          
  [facility]            Outpatient                                                    
        

 

 [id number]            2013 15:44:00                                      
Document Registration                                                          
  

 

 [id number]            2017 11:40:00            2017 23:59:59            
[facility]            Outpatient            AIMEE REED LAC                         
[facility]                     

 

 [id number]            2017 11:40:00                                      
Document Registration                                                          
  

 

 [id number]            2017 19:06:00                                   
   Document Registration

## 2018-10-16 NOTE — XMS REPORT
[facility]

 Created on: 2018



[employer]

External Reference #: [id number]

: 1945

Sex: Female



Demographics







 Address  [address] [place]  [address]

 

 Preferred Language  Unknown

 

 Marital Status  Unknown

 

 Pentecostalism Affiliation  Unknown

 

 Race  Unknown

 

 Ethnic Group  Unknown





Author







 Author  JUDY MERLOS

 

 Organization  [facility]

 

 Address  [address] [place]  [address]



 

 Phone  [phone]







Care Team Providers







 Care Team Member Name  Role  Phone

 

 JUDY MERLOS  Unavailable  [phone]







PROBLEMS







 Type  Condition  ICD9-CM Code  BAT56-DU Code  Onset Dates  Condition Status  
SNOMED Code

 

 Problem  Elevated blood pressure reading without diagnosis of hypertension  
796.2        Active  294981129

 

 Problem  Cough  786.2        Active  81475789

 

 Problem  Nausea with vomiting  787.01        Active  63357991

 

 Problem  Family history of malignant neoplasm of gastrointestinal tract  V16.0
        Active  765676967

 

 Problem  Personal history of colonic polyps  V12.72        Active  807885565

 

 Problem  Unspecified conjunctivitis  372.30        Active  1264426

 

 Problem  Redness or discharge of eye  379.93        Active  17262716

 

 Problem  Urinary tract infection, site not specified  599.0        Active  
58370642

 

 Problem  Actinic keratosis  702.0        Active  889882133

 

 Problem  Other and unspecified noninfectious gastroenteritis and colitis  
558.9        Active  06461709

 

 Problem  Blood in stool  578.1        Active  285171800







ALLERGIES

No Known Allergies



ENCOUNTERS







 Encounter  Location  Date  Diagnosis

 

 [facility]  [address] [phone]  20 Sep, 2017  Dysuria R30.0 and Bronchitis J40

 

 [facility]  [id number] [place] [address]
-[phone]    Acute allergic rhinitis due to pollen, unspecified 
seasonality J30.1

 

 [facility]  [address] 
[id number]    Dental examination Z01.20

 

 [facility]  [id number] [place] [address] 
[id number]    Dental examination Z01.20

 

 [facility]  [address] 
[id number]  13 Oct, 2016  Dental examination Z01.20

 

 [facility]  [address] [id number] [place] 
[id number]  24 Aug, 2016  Dental examination Z01.20

 

 [facility]  [address] [id number] [place] [address]     

 

 [facility]  [address] [id number] [place] [address]-
[address]     

 

 [facility]  [address] [id number] [place] [address]-
[address]  17 Mar, 2015   

 

 [facility]  [address] [id number] [place] [address]-
[address]  17 Mar, 2015   

 

 [facility]  [address] [id number] [place] [address]
86[address]  05 May, 2014   

 

 [facility]  [address] [id number] [place] [address]-
67[address]  05 May, 2014   

 

 [facility]  [address] [id number] [place] [address]-
5015  21 Aug, 2013   

 

 [facility]  [address] [id number] [place] [address]-
[address]  20 Aug, 2013   

 

 [facility]  [address] [id number] [place] [address]-
5244  19 Aug, 2013   

 

 [facility]  [address] [id number][place] [address]
6899  18 Dec, 2012   

 

 [facility]  [address] [id number], [place] [address]
4054  18 Dec, 2012   

 

 [facility]  [address] [id number] [place] [address]-
6[address]     

 

 [facility]  [address] [id number] [place] [address]-
6409  05 Mar, 2012   

 

 [facility]  [address] [id number] [place] [address]
5338  03 Mar, 2012   

 

 [facility] [place] [facility]  [address] [id number] [address]
2546  01 Mar, 2012   

 

 [facility]  [address] [address]     

 

 [facility]  [address] [address]     

 

 [facility]  [address] [address]
2181     

 

 [facility]  [address] [address]
8116  19 Oct, 2011   

 

 [facility]  [address] [place] [address]-
8699  19 Oct, 2011   

 

 [facility]  [address] [address]-
9668  19 Oct, 2011   

 

 [facility]  [address], [place] [address]-
0230  11 Sep, 2009   







IMMUNIZATIONS

No Known Immunizations



SOCIAL HISTORY

Never Assessed



REASON FOR VISIT

congestion, cough, chest mucous started last month JStrasserRN, Burning with 
urination started several days ago 



PLAN OF CARE





VITAL SIGNS







 Height  64 in  2017

 

 Weight  190.0 lbs  2017

 

 Temperature  98.2 degrees Fahrenheit  2017

 

 Heart Rate  84 bpm  2017

 

 Respiratory Rate  18   2017

 

 BMI  32.61 kg/m2  2017

 

 Blood pressure systolic  128 mmHg  2017

 

 Blood pressure diastolic  80 mmHg  2017







MEDICATIONS







 Medication  Instructions  Dosage  Frequency  Start Date  End Date  Duration  
Status

 

 PredniSONE 20 mg  Orally Once a day  2 tablets  24h  20 Sep, 2017  25 Sep, 
2017  05 days  Active

 

 Doxycycline Hyclate 100 MG  Orally every 12 hrs  1 capsule  12h  20 Sep, 2017  
25 Sep, 2017  5 day(s)  Active

 

 Zyrtec Allergy 10 MG  Orally Once a day  1 tablet  24h           Active

 

 Ibuprofen                    Active







RESULTS







 Name  Result  Date  Reference Range

 

 UA LONG DIP (IN HOUSE)     2017   

 

 Lot #  [id number]      

 

 Exp date  2018      

 

 Clarity  cloudy      

 

 Color  dark yellow      

 

 Odor  yes      

 

 GLU  negative      

 

 JULISSA  negative      

 

 KET  trace      

 

 SG  1.020      

 

 BLO  3+      

 

 pH  6.5      

 

 Protein  3+      

 

 URO  1.0      

 

 NIT  positive      

 

 DAVID  3+      

 

 Lot #  [id number]      

 

 Exp date  2018      

 

 CULTURE, URINE     2017   

 

 Urine Culture, Routine  Final report      

 

 Result 1  Escherichia coli      

 

 Antimicrobial Susceptibility         







PROCEDURES







 Procedure  Date Ordered  Result  Body Site

 

 URINALYSIS, AUTO, W/O SCOPE  2017      

 

 [facility]      

 

 [facility] VISIT ESTABLISHED PATIENT  2017      







INSTRUCTIONS





MEDICATIONS ADMINISTERED

No Known Medications



MEDICAL (GENERAL) HISTORY







 Type  Description  Date

 

 Medical History  hbp in the past   

 

 Surgical History  hysterectomy, total with bilateral salpingo-oophorectomy (BSO
)

## 2018-10-16 NOTE — XMS REPORT
[facility]

 Created on: 2017



Funmi Loja

External Reference #: [id number]

: 1945

Sex: Female



Demographics







 Address  [address] [place]  [address]

 

 Preferred Language  Unknown

 

 Marital Status  Unknown

 

 Catholic Affiliation  Unknown

 

 Race  Unknown

 

 Ethnic Group  Unknown





Author







 Author  ASHIA THOMAS

 

 [facility]

 

 Address  Unknown

 

 Phone  [phone]







Care Team Providers







 Care Team Member Name  Role  Phone

 

 ASHIA THOMAS  Unavailable  [phone]







PROBLEMS







 Type  Condition  ICD9-CM Code  URU37-PP Code  Onset Dates  Condition Status  
SNOMED Code

 

 Problem  Other and unspecified noninfectious gastroenteritis and colitis  
558.9        Active  61339852

 

 Problem  Unspecified conjunctivitis  372.30        Active  6242569

 

 Problem  Redness or discharge of eye  379.93        Active  24571216

 

 Problem  Nausea with vomiting  787.01        Active  62046804

 

 Problem  Blood in stool  578.1        Active  902970269

 

 Problem  Urinary tract infection, site not specified  599.0        Active  
06266073

 

 Problem  Cough  786.2        Active  71323863

 

 Problem  Personal history of colonic polyps  V12.72        Active  420786234

 

 Problem  Family history of malignant neoplasm of gastrointestinal tract  V16.0
        Active  608099004

 

 Problem  Elevated blood pressure reading without diagnosis of hypertension  
796.2        Active  098208410

 

 Problem  Actinic keratosis  702.0        Active  741313629







ALLERGIES







 Substance  Reaction  Event Type  Date  Status

 

 N.K.D.A.  Unknown  Non Drug Allergy    Unknown







SOCIAL HISTORY

No smoking Hx information available



PLAN OF CARE







 Activity  Details









  









 Follow Up  prn Reason:fillings







VITAL SIGNS







 Blood pressure systolic  138 mmHg  2016

 

 Blood pressure diastolic  94 mmHg  2016







MEDICATIONS

No Known Medications



RESULTS

No Results



PROCEDURES







 Procedure  Date Ordered  Related Diagnosis  Body Site

 

 INTRAORL-PERIAPICAL 1 FILM 72973  2016      

 

 RESIN COMPOS - 3 SURFACES ANTERIOR  2016      







IMMUNIZATIONS

No Known Immunizations

## 2018-10-16 NOTE — CONSULTATION-CARDIOLOGY
HPI-Cardiology


Cardiology Consultation:


Date of Consultation


10/16/18


Time Seen by a Provider:  13:25


Date of Admission





Attending Physician


Fifi River MD


Admitting Physician


No,Local Physician


Consulting Physician


MARY ANN CARROLL MD, MA, FACP, FACC, FSCAI, CCDS





Physician requesting consult: [facility]





HPI:


Chief Complaint:


Reason for consultation: New onset a-fib with RVR


HPI:





Ms. Loja is a 73 year old female admitted to ICU 10 from the ED with new 

onset a-fib with RVR. She awoke with a feeling of palp and chest discomfort 

this am and went to her pcp from where she was sent to the ER. Was found to 

have A Fib with RVR. Has been having similar symptoms lasting less than an hour 

for the last several days at home. Today's episode was much more prolonged and 

lasted several hours prior to improvement in ER with vent rate control. Did 

received s/l NTG in ER but it did not help symptoms much. Chest discomfort is a 

feeling of pressure in the mid or L parasternal area that radiates to L neck 

and mod in intensity and not associated with symptoms other than gen malaise 

and a feeling of rapid heart beat. It is w/o aggravating or relieving factors





Review of Systems-Cardiology


Review of Systems


Constitutional:  No chills, No fever; tiredness


Eyes:  No vision change


Ears/Nose/Throat:  No recent hearing loss


Respiratory:  As described under HPI


Cardiovascular:  As described under HPI


Gastrointestinal:  abdomen distended; No constipation, No diarrhea; nausea; No 

vomiting


Genitourinary:  No dysuria, No hematuria


Skin:  No rash, No ulcerations


Psychiatric/Neurological:  No anxiety, No depression, No seizure, No focal 

weakness, No syncope


Hematologic:  No bleeding abnormalities





PMH-Social-Family Hx


Patient Social History


Alcohol Use:  Denies Use


Recreational Drug Use:  No


Smoking Status:  Never a Smoker


Recent Foreign Travel:  No


Recent Infectious Disease Expo:  No


Hospitalization with Isolation:  Denies





Past Medical History


PMH


As described under Assessment.





Family Medical History


Family History:  


Colon cancer


  19 MOTHER


Hypertension


  19 MOTHER


  G8 BROTHER





Allergies and Home Medications


Allergies


Coded Allergies:  


     No Known Drug Allergies (Unverified , 10/16/18)





Patient Home Medication List


Home Medication List Reviewed:  Yes





Physical Exam-Cardiology


Physical Exam


Vital Signs/I&O











 10/16/18 10/16/18 10/16/18 10/16/18





 09:10 09:15 09:50 10:02


 


Pulse    113


 


Resp    18


 


B/P (MAP)   122/93 135/96 (109)


 


Pulse Ox    98


 


O2 Delivery  Room Air  Room Air





 10/16/18 10/16/18 10/16/18 10/16/18





 10:54 12:10 12:30 13:00


 


Temp   98.2 


 


Pulse 100 93 103 92


 


Resp 18 18 14 


 


B/P (MAP) 134/95 (108) 125/75 (92) 127/80 (96) 


 


Pulse Ox 97 96 98 


 


O2 Delivery Room Air Room Air Room Air 


 


    





 10/16/18   





 13:00   


 


Pulse 105   


 


Resp 8   


 


B/P (MAP) 122/86 (98)   


 


Pulse Ox 98   


 


O2 Delivery Room Air   





Capillary Refill : Less Than 3 Seconds


Constitutional:  AAO x 3, well-developed, well-nourished


HEENT:  EOMI, hearing is well preserved; No xanthelasmas are seen


Neck:  No carotid bruit; carotid pulses are 2 + bilaterally


Respiratory:  No accessory muscle use; lungs clear to percussion, lungs clear 

to auscultation


Cardiovascular:  irregularly irregular, S1 and S2, systolic murmur (faint WILLIAM 

at card base)


Gastrointestinal:  No tender, No guarding, No rebound; audible bowel sounds


Extremities:  No clubbing, No cyanosis, No significant edema


Neurologic/Psychiatric:  grossly intact, power is 5/5 both on sides


Skin:  No rash on exposed areas, No ulcerations on exposed areas





Data Review


Labs


Laboratory Tests


10/16/18 09:30: 


White Blood Count 7.5, Red Blood Count 4.31L, Hemoglobin 14.0, Hematocrit 40, 

Mean Corpuscular Volume 94, Mean Corpuscular Hemoglobin 32, Mean Corpuscular 

Hemoglobin Concent 35, Red Cell Distribution Width 13.6, Platelet Count 277, 

Mean Platelet Volume 9.0, Neutrophils (%) (Auto) 64, Lymphocytes (%) (Auto) 27, 

Monocytes (%) (Auto) 7, Eosinophils (%) (Auto) 1, Basophils (%) (Auto) 0, 

Neutrophils # (Auto) 4.8, Lymphocytes # (Auto) 2.1, Monocytes # (Auto) 0.5, 

Eosinophils # (Auto) 0.1, Basophils # (Auto) 0.0, Prothrombin Time 13.5, INR 

Comment 1.0, Activated Partial Thromboplast Time 29, Sodium Level 143, 

Potassium Level 4.0, Chloride Level 112H, Carbon Dioxide Level 18L, Anion Gap 13

, Blood Urea Nitrogen 25H, Creatinine 0.78, Estimat Glomerular Filtration Rate 

> 60, BUN/Creatinine Ratio 32, Glucose Level 107H, Calcium Level 9.6, Corrected 

Calcium 9.8, Magnesium Level 2.0, Total Bilirubin 1.0, Aspartate Amino Transf (

AST/SGOT) 17, Alanine Aminotransferase (ALT/SGPT) 17, Alkaline Phosphatase 86, 

Myoglobin 49.0, Troponin I < 0.30, Total Protein 6.4, Albumin 3.7, TSH [place] 

Testing 1.55





Laboratory Tests


10/16/18 09:30











A/P-Cardiology


Assessment/Admission Diagnosis





New onset a-fib with RVR (first diagnosed on 10/16/18)





Chest discomfort during episodes of A Fib with RVR





Fam h/o early CAD (son)





Discussion and Recomendations





* Treat with long-acting dilt and bb and apixaban


* We reviewed and discussed the rationale and pros and cons of above meds and 

answered questions


* Serial card enz and ECG


* If evidence of ACS, then consider cath


* Monitor labs


* Echo to eval for structural heart disease


* Further recs to be based on hosp course





Clinical Quality Measures


AMI/AHF:


ASA po Prior to arrival:  Yes (BY [facility] )











MARY ANN CARROLL MD FACP FAC CCDS Oct 16, 2018 13:39

## 2018-10-17 VITALS — SYSTOLIC BLOOD PRESSURE: 142 MMHG | DIASTOLIC BLOOD PRESSURE: 69 MMHG

## 2018-10-17 VITALS — SYSTOLIC BLOOD PRESSURE: 135 MMHG | DIASTOLIC BLOOD PRESSURE: 70 MMHG

## 2018-10-17 VITALS — DIASTOLIC BLOOD PRESSURE: 65 MMHG | SYSTOLIC BLOOD PRESSURE: 127 MMHG

## 2018-10-17 VITALS — SYSTOLIC BLOOD PRESSURE: 143 MMHG | DIASTOLIC BLOOD PRESSURE: 63 MMHG

## 2018-10-17 LAB
CHOLEST SERPL-MCNC: 196 MG/DL (ref ?–200)
HDLC SERPL-MCNC: 41 MG/DL (ref 40–60)
TRIGL SERPL-MCNC: 155 MG/DL (ref ?–150)
VLDLC SERPL CALC-MCNC: 31 MG/DL (ref 5–40)

## 2018-10-17 RX ADMIN — APIXABAN SCH MG: 5 TABLET, FILM COATED ORAL at 08:44

## 2018-10-17 RX ADMIN — Medication SCH ML: at 05:32

## 2018-10-17 NOTE — SHORT STAY SUMMARY
History of Present Illness


History of Present Illness


Reason for visit/HPI


72 yo F that presented to ER after having palpitations and chest pain. States 

that she has been having increase in fatigue for the past few weeks. States 

that she has felt her heart skipping beats but that she did not have pain 

associated with it prior to yesterday and that is what made her present to ER. 

Never been diagnosed with A fib prior to yesterday. Denies any weakness and 

numbness in her arms or legs. Denies any previous heart conditions.


Date of Admission


Oct 16, 2018 at 11:39 am


Date of Discharge


10/17/18


Time Seen by Provider:  11:25


Attending Physician


Ирина River MD


Admitting Physician


No,Local Physician


Consult








Allergies and Home Medications


Allergies


Coded Allergies:  


     No Known Drug Allergies (Unverified , 10/16/18)





Home Medications


Apixaban 5 Mg Tablet, 5 MG PO BID


   Prescribed by: ИРИНА RIVER on 10/17/18 1201


Metoprolol Succinate 50 Mg Tab.er.24h, 50 MG PO DAILY


   Prescribed by: ИРИНА RIVER on 10/17/18 1201





Patient Home Medication List


Home Medication List Reviewed:  Yes





Past Medical-Social-Family Hx


Patient Social History


Living Status:  Lives at home, son checks on patient regularly


Alcohol Use:  Denies Use


Recreational Drug Use:  No


Smoking Status:  Never a Smoker


Physical Abuse Screen:  No


Sexual Abuse:  No


Recent Foreign Travel:  No


Contact w/other who traveled:  No


Recent Infectious Disease Expo:  No





Seasonal Allergies


Seasonal Allergies:  No





Surgeries


Yes (anterior-posterior pelvic repair)


Hysterectomy





Respiratory


No





Cardiovascular


No





Neurological


No





Reproductive System


GYN History:  Hysterectomy





Genitourinary


No





Gastrointestinal


No





Musculoskeletal


No





Endocrine


History of Endocrine Disorders:  No





HEENT


History of HEENT Disorders:  No





Cancer


No





Psychosocial


History of Psychiatric Problem:  No





Integumentary


History of Skin or Integumenta:  No





Family Medical History


Family Hx:  


Colon cancer


  19 MOTHER


Hypertension


  19 MOTHER


  G8 BROTHER





Review of Systems


Constitutional:  malaise, weakness


EENTM:  no symptoms reported; No hearing loss, No vision loss


Respiratory:  no symptoms reported; No cough, No dyspnea on exertion, No 

orthopnea, No short of breath


Cardiovascular:  chest pain (resolved this AM), palpitations


Gastrointestinal:  no symptoms reported; No constipation, No diarrhea, No melena

, No nausea, No vomiting


Genitourinary:  no symptoms reported; No dysuria, No frequency, No hematuria


Pregnant:  No


Musculoskeletal:  no symptoms reported; No back pain, No joint pain, No muscle 

pain


Skin:  no symptoms reported; No lesions, No rash


Psychiatric/Neurological:  No Symptoms Reported; Denies Headache, Denies 

Numbness, Denies Weakness





Physical Exam


Vital Signs





Vital Signs - First Documented








 10/16/18 10/16/18 10/16/18 10/16/18





 09:15 09:50 10:02 12:30


 


Temp    98.2


 


Pulse   113 


 


Resp   18 


 


B/P (MAP)  122/93  


 


Pulse Ox   98 


 


O2 Delivery Room Air   





Capillary Refill : Less Than 3 Seconds


Height, Weight, BMI


Height: 5'4.00"


Weight: 189lbs. 3.2oz. 85.682548td; 32.7 BMI


Method:Stated


General Appearance:  No Apparent Distress, WD/WN


HEENT:  PERRL/EOMI


Neck:  Full Range of Motion, Normal Inspection, Non Tender, Supple


Respiratory:  Chest Non Tender, Lungs Clear, Normal Breath Sounds, No Accessory 

Muscle Use, No Respiratory Distress


Cardiovascular:  Regular Rate, Rhythm, No Murmur


Gastrointestinal:  Normal Bowel Sounds, Non Tender, Soft


Back:  No CVA Tenderness, No Vertebral Tenderness


Extremity:  Normal Capillary Refill, Non Tender, No Calf Tenderness, No Pedal 

Edema


Neurologic/Psychiatric:  Alert, Oriented x3, No Motor/Sensory Deficits, Normal 

Mood/Affect, CNs II-XII Norm as Tested


Skin:  Normal Color, Warm/Dry


Lymphatic:  No Adenopathy





Clinical Quality Measures


AMI/AHF:


ASA po Prior to arrival:  Yes (BY [facility] )





DVT/VTE Risk/Contraindication:


Risk Factor Score Per Nursin


RFS Level Per Nursing on Admit:  2=Moderate





Short Stay Diagnosis


Discharge Diagnosis-Short Stay


Admission Diagnosis:  


Atrial Fibrillation with RVR


Final Discharge Diagnosis:  


Atrial Fibrillation with RVR





Conclusion


Labs


Laboratory Tests


10/17/18 05:55: 


Triglycerides Level 155H, Cholesterol Level 196, LDL Cholesterol Direct 144H, 

VLDL Cholesterol 31, HDL Cholesterol 41


Conclusion/Plan


72 yo F that presented to ER with A fib with RVR new onset





Plan





New Onset A fib with RVR


- Cardiology consulted


- Normal Echo


- Started on BB for rate control


- Started on anticoagulation


- Has close f.u with cardiology and [facility] next week





Copy


Copies To 1:   [facility]











ИРИНА RIVER MD Oct 17, 2018 12:00

## 2018-10-17 NOTE — DISCHARGE INSTRUCTIONS
Discharge Inst-[facility]


Discharge Medications


New, Converted or Re-Newed RX:  Transmitted to Pharmacy


New Medications:  


Apixaban (Eliquis) 5 Mg Tablet


5 MG PO BID for 30 Days, #60 TAB





Metoprolol Succinate (Metoprolol Succinate) 50 Mg Tab.er.24h


50 MG PO DAILY, #30 TAB





 


Discontinued Medications:  


Aspirin/Caffeine (Gerry Back & Body Caplet) 1 Each Tablet


2 TAB PO DAILY, TAB











Patient Instructions


Goal/Follow Up Appt:  


You have a follow up owen with MARILEE Vaughan on 10/23 @ 1020


Patient Instructions:  


- Be sure to take your blood thinner


Return to The Hospital For:  


- Unable to tolerate medications


- Chest pain


- Shortness of breath





Activity & Diet


Discharge Diet:  Cardiac Diet


Activity as Tolerated:  Yes





Orders-Post D/C & Referrals


Pneu Vac Indicated:  Yes





Copy


Copies To 1:   [facility] ИРИНА Mendoza MD Oct 17, 2018 12:04 pm

## 2018-10-17 NOTE — PROGRESS NOTE-CARDIOLOGY
Cardiology SOAP Progress Note


Subjective:


Sitting up in a chair at the bedside.  Eager to go home.  No c/o CP, 

palpitations, syncope or near syncope.





Objective:


I&O/Vital Signs











 10/17/18 10/17/18 10/17/18 10/17/18





 07:00 08:00 08:40 12:00


 


Temp   97.5 95.3


 


Pulse 60 60 62 72


 


Resp   16 16


 


B/P (MAP)   135/70 (91) 143/63 (89)


 


Pulse Ox   92 93


 


O2 Delivery   Room Air Room Air


 


    





 10/17/18 10/17/18  





 13:00 14:13  


 


Pulse 60   


 


B/P (MAP)    














 10/17/18





 00:00


 


Intake Total 990 ml


 


Balance 990 ml








Weight (Pounds):  189


Weight (Ounces):  3.2


Weight (Calculated Kilograms):  85.405530


Constitutional:  AAO x 3, well-developed, well-nourished


Respiratory:  No accessory muscle use; lungs clear to percussion, lungs clear 

to auscultation


Cardiovascular:  regular rate-rhythm, S1 and S2, systolic murmur (faint WILLIAM at 

card base)


Gastrointestional:  No tender, No guarding, No rebound; audible bowel sounds


Extremities:  No clubbing, No cyanosis, No significant edema


Neurologic/Psychiatric:  grossly intact, power is 5/5 both on sides


Skin:  No rash on exposed areas, No ulcerations on exposed areas





Results/Procedures:


Labs


Laboratory Tests


10/17/18 05:55: 


Triglycerides Level 155H, Cholesterol Level 196, LDL Cholesterol Direct 144H, 

VLDL Cholesterol 31, HDL Cholesterol 41





Microbiology


10/16/18 MRSA Screen - Final, Complete


           MRSA not isolated





Laboratory Tests


10/16/18 09:30











A/P:


Assessment:





PAF, first diagnosed on 10/16/18. Currently NSR





Echo on 10/16/18: LVEF 55-60%, LA mildly to mod dilated, PASP 30-35 mmHg





Chest discomfort during episodes of A Fib with RVR





Fam h/o early CAD (son)


Plan:





* Converted to SR 


* Echo pending for structural heart disease


* She is eager to go home d/t being the primary care giver of her 12 year old 

grand son


* Ok to discharge home


* Medications as ordered


* Discussed rationale and importance of medications


* D/t episodes of bradycardia after converting to SR we will stop the CCB; 

continue BB


* Advised f/u appt next week with MARILEE Jolley





Physician Assessment


Physician Assessment


No cp or palp or syncope or shortness of breath. Wishes to go home


Lungs: clear


Cor: reg


Ext: no c/c/e


Neuro: bilat equal power





A&R


* As documented in our note above that I updated (italics) and as noted below


* I reviewed her CV issues with her 


* I reviewed echo results with her


* BB and apixaban added to regimen. Pros and cons and rationale of meds 

discussed and compliance advised


* Questions answered


* Outpt f/u advised





Clinical Quality Measures


AMI/AHF:


ASA po Prior to arrival:  Yes (BY [facility] )











SHAHLA SPAULDING ARNKATHERYN Oct 17, 2018 09:37


MARY ANN CARROLL MD FACP [facility] Oct 17, 2018 18:00

## 2023-05-05 ENCOUNTER — HOSPITAL ENCOUNTER (OUTPATIENT)
Dept: HOSPITAL 75 - ER | Age: 78
Setting detail: OBSERVATION
LOS: 1 days | Discharge: HOME | End: 2023-05-06
Attending: INTERNAL MEDICINE | Admitting: INTERNAL MEDICINE
Payer: MEDICARE

## 2023-05-05 VITALS — WEIGHT: 182.76 LBS | BODY MASS INDEX: 32.38 KG/M2 | HEIGHT: 62.99 IN

## 2023-05-05 VITALS — DIASTOLIC BLOOD PRESSURE: 67 MMHG | SYSTOLIC BLOOD PRESSURE: 100 MMHG

## 2023-05-05 VITALS — DIASTOLIC BLOOD PRESSURE: 92 MMHG | SYSTOLIC BLOOD PRESSURE: 176 MMHG

## 2023-05-05 DIAGNOSIS — I25.10: ICD-10-CM

## 2023-05-05 DIAGNOSIS — I10: ICD-10-CM

## 2023-05-05 DIAGNOSIS — E11.9: ICD-10-CM

## 2023-05-05 DIAGNOSIS — R77.8: ICD-10-CM

## 2023-05-05 DIAGNOSIS — G89.29: ICD-10-CM

## 2023-05-05 DIAGNOSIS — M54.9: ICD-10-CM

## 2023-05-05 DIAGNOSIS — E78.5: ICD-10-CM

## 2023-05-05 DIAGNOSIS — M25.569: ICD-10-CM

## 2023-05-05 DIAGNOSIS — I48.0: Primary | ICD-10-CM

## 2023-05-05 LAB
ALBUMIN SERPL-MCNC: 3.6 GM/DL (ref 3.2–4.5)
ALP SERPL-CCNC: 74 U/L (ref 40–136)
ALT SERPL-CCNC: 11 U/L (ref 0–55)
APTT BLD: 30 SEC (ref 24–35)
BASOPHILS # BLD AUTO: 0 10^3/UL (ref 0–0.1)
BASOPHILS NFR BLD AUTO: 0 % (ref 0–10)
BILIRUB SERPL-MCNC: 0.8 MG/DL (ref 0.1–1)
BUN/CREAT SERPL: 25
CALCIUM SERPL-MCNC: 9.7 MG/DL (ref 8.5–10.1)
CHLORIDE SERPL-SCNC: 107 MMOL/L (ref 98–107)
CO2 SERPL-SCNC: 20 MMOL/L (ref 21–32)
CREAT SERPL-MCNC: 0.87 MG/DL (ref 0.6–1.3)
EOSINOPHIL # BLD AUTO: 0.1 10^3/UL (ref 0–0.3)
EOSINOPHIL NFR BLD AUTO: 1 % (ref 0–10)
GFR SERPLBLD BASED ON 1.73 SQ M-ARVRAT: 68 ML/MIN
GLUCOSE SERPL-MCNC: 105 MG/DL (ref 70–105)
HCT VFR BLD CALC: 41 % (ref 35–52)
HGB BLD-MCNC: 13.9 G/DL (ref 11.5–16)
INR PPP: 1 (ref 0.8–1.4)
LYMPHOCYTES # BLD AUTO: 1.9 10^3/UL (ref 1–4)
LYMPHOCYTES NFR BLD AUTO: 25 % (ref 12–44)
MAGNESIUM SERPL-MCNC: 1.7 MG/DL (ref 1.6–2.4)
MANUAL DIFFERENTIAL PERFORMED BLD QL: NO
MCH RBC QN AUTO: 32 PG (ref 25–34)
MCHC RBC AUTO-ENTMCNC: 34 G/DL (ref 32–36)
MCV RBC AUTO: 94 FL (ref 80–99)
MONOCYTES # BLD AUTO: 0.5 10^3/UL (ref 0–1)
MONOCYTES NFR BLD AUTO: 6 % (ref 0–12)
NEUTROPHILS # BLD AUTO: 5.2 10^3/UL (ref 1.8–7.8)
NEUTROPHILS NFR BLD AUTO: 67 % (ref 42–75)
PLATELET # BLD: 247 10^3/UL (ref 130–400)
PMV BLD AUTO: 9.2 FL (ref 9–12.2)
POTASSIUM SERPL-SCNC: 4.1 MMOL/L (ref 3.6–5)
PROT SERPL-MCNC: 6.5 GM/DL (ref 6.4–8.2)
PROTHROMBIN TIME: 14 SEC (ref 12.2–14.7)
SODIUM SERPL-SCNC: 139 MMOL/L (ref 135–145)
WBC # BLD AUTO: 7.7 10^3/UL (ref 4.3–11)

## 2023-05-05 PROCEDURE — 93306 TTE W/DOPPLER COMPLETE: CPT

## 2023-05-05 PROCEDURE — 85025 COMPLETE CBC W/AUTO DIFF WBC: CPT

## 2023-05-05 PROCEDURE — 96360 HYDRATION IV INFUSION INIT: CPT

## 2023-05-05 PROCEDURE — 80053 COMPREHEN METABOLIC PANEL: CPT

## 2023-05-05 PROCEDURE — 93041 RHYTHM ECG TRACING: CPT

## 2023-05-05 PROCEDURE — 71275 CT ANGIOGRAPHY CHEST: CPT

## 2023-05-05 PROCEDURE — 94761 N-INVAS EAR/PLS OXIMETRY MLT: CPT

## 2023-05-05 PROCEDURE — 84443 ASSAY THYROID STIM HORMONE: CPT

## 2023-05-05 PROCEDURE — 83735 ASSAY OF MAGNESIUM: CPT

## 2023-05-05 PROCEDURE — 83880 ASSAY OF NATRIURETIC PEPTIDE: CPT

## 2023-05-05 PROCEDURE — 71045 X-RAY EXAM CHEST 1 VIEW: CPT

## 2023-05-05 PROCEDURE — 99284 EMERGENCY DEPT VISIT MOD MDM: CPT

## 2023-05-05 PROCEDURE — 36415 COLL VENOUS BLD VENIPUNCTURE: CPT

## 2023-05-05 PROCEDURE — 85730 THROMBOPLASTIN TIME PARTIAL: CPT

## 2023-05-05 PROCEDURE — 93005 ELECTROCARDIOGRAM TRACING: CPT

## 2023-05-05 PROCEDURE — 85379 FIBRIN DEGRADATION QUANT: CPT

## 2023-05-05 PROCEDURE — 84484 ASSAY OF TROPONIN QUANT: CPT

## 2023-05-05 PROCEDURE — 85610 PROTHROMBIN TIME: CPT

## 2023-05-05 RX ADMIN — ACETAMINOPHEN PRN MG: 325 TABLET ORAL at 16:52

## 2023-05-05 RX ADMIN — SENNOSIDES SCH MG: 8.6 TABLET, FILM COATED ORAL at 21:44

## 2023-05-05 RX ADMIN — DOCUSATE SODIUM SCH MG: 100 CAPSULE ORAL at 21:44

## 2023-05-05 RX ADMIN — SODIUM CHLORIDE SCH MLS/HR: 900 INJECTION, SOLUTION INTRAVENOUS at 16:46

## 2023-05-05 NOTE — CONSULTATION-CARDIOLOGY
HPI-Cardiology


Cardiology Consultation:


Date of Consultation


5/5/23


Time Seen by a Provider:  15:10


Date of Admission





Attending Physician


Xenia,Local Physician


Admitting Physician


Admitting Physician:


Guillermina Leon DO 








Attending Physician:


Guillermina Leon DO


Consulting Physician


MARY ANN CARROLL MD, MA, FACP, FACC, FSCAI, CCDS





Physician requesting consult: Dr Leon





HPI:


Chief Complaint:


Reason for Card consult: PAF


Ms. Loja is a 77 yr old female being admitted from the ED to [facility].  She has 

been seen in the ED.  She reports increasing weakness, fatigue and SOB for the 

last several weeks.  She reports she was having dizziness with episodes of 

blurred vision.  She reports she would check her blood pressure at home and her 

BP would be low.  She states last night she woke up from sleep with palpitations

and some heaviness in her chest, but she was able to go to sleep.  She woke up 

this morning with continued symptoms of palpitations, dizziness, blurred vision 

and heaviness in her chest.  She came to the ED and was found to be in a-fib 

with RVR.  She has since converted to SR.  She reports he symptoms have 

resolved.  She reports she usually take Toprol at home, but has not taken it for

several days d/t low BP.  No c/o LE swelling.  No c/o n/v/d.  No c/o fever or 

chills.





Review of Systems-Cardiology


Review of Systems


Constitutional:  No chills, No fever; lightheadedness


Eyes:  As described under HPI


Ears/Nose/Throat:  No epistaxis, No recent hearing loss


Respiratory:  As described under HPI


Cardiovascular:  As described under HPI


Gastrointestinal:  No constipation, No diarrhea, No nausea, No vomiting


Genitourinary:  No dysuria


Musculoskeletal:  As describe under HPI


Skin:  No rash on exposed areas, No ulcerations on exposed areas


Psychiatric/Neurological:  No anxiety, No depression, No seizure, No focal 

weakness, No syncope


Hematologic:  No bleeding abnormalities





PMH-Social-Family Hx


Patient Social History


Have you traveled recently?:  No


Alcohol Use?:  No


Pt feels they are or have been:  No





Past Medical History


PMH


As described under Assessment.





Family Medical History


Family Medical History:  


She reports her mother and brother have HTN.


She reports a son who has CAD.


Family History:  


Colon cancer


  19 MOTHER


Hypertension


  19 MOTHER


  G8 BROTHER





Allergies and Home Medications


Allergies


Coded Allergies:  


     No Known Drug Allergies (Unverified , 10/16/18)





Patient Home Medication List


Home Medication List Reviewed:  Yes


Apixaban (Eliquis) 5 Mg Tablet, 5 MG PO BID


   Prescribed by: ИРИНА MARTINEZ on 10/17/18 1201


Metoprolol Succinate (Metoprolol Succinate) 50 Mg Tab.er.24h, 50 MG PO DAILY


   Prescribed by: ИРИНА MARTINEZ on 10/17/18 1201





Physical Exam-Cardiology


Physical Exam


Vital Signs/I&O











 5/5/23 5/5/23 5/5/23





 11:00 14:30 14:30


 


Temp 36.3  


 


Pulse 144 70 


 


Resp 16 16 


 


B/P (MAP) 174/158 (163) 125/76 


 


Pulse Ox 97 96 95


 


O2 Delivery Room Air Room Air Room Air





Capillary Refill : Less Than 3 Seconds


Constitutional:  AAO x 3, well-developed, well-nourished


HEENT:  PERRL, hearing is well preserved, oral hygience is good


Neck:  No carotid bruit; carotid pulses are 2 + bilaterally


Respiratory:  No accessory muscle use, No respiratory distress; chest expansion 

is symmetric, chest is bilaterally symmetric, lungs clear to auscultation


Cardiovascular:  regular rate-rhythm; No JVD; S1 and S2


Gastrointestinal:  No tender; soft, round; No guarding; audible bowel sounds


Extremities:  no lower extremity edema bilateral


Neurologic/Psychiatric:  grossly intact (moves all extremities)


Skin:  No rash on exposed areas, No ulcerations on exposed areas





Data Review


Labs


Laboratory Tests


5/5/23 11:25: 


White Blood Count 7.7, Red Blood Count 4.35, Hemoglobin 13.9, Hematocrit 41, 

Mean Corpuscular Volume 94, Mean Corpuscular Hemoglobin 32, Mean Corpuscular 

Hemoglobin Concent 34, Red Cell Distribution Width 13.0, Platelet Count 247, 

Mean Platelet Volume 9.2, Immature Granulocyte % (Auto) 0, Neutrophils (%) 

(Auto) 67, Lymphocytes (%) (Auto) 25, Monocytes (%) (Auto) 6, Eosinophils (%) 

(Auto) 1, Basophils (%) (Auto) 0, Neutrophils # (Auto) 5.2, Lymphocytes # (Auto)

1.9, Monocytes # (Auto) 0.5, Eosinophils # (Auto) 0.1, Basophils # (Auto) 0.0, 

Immature Granulocyte # (Auto) 0.0, Prothrombin Time 14.0, INR Comment 1.0, 

Activated Partial Thromboplast Time 30, D-Dimer 0.94H, Sodium Level 139, 

Potassium Level 4.1, Chloride Level 107, Carbon Dioxide Level 20L, Anion Gap 12,

Blood Urea Nitrogen 22H, Creatinine 0.87, Estimat Glomerular Filtration Rate 68,

BUN/Creatinine Ratio 25, Glucose Level 105, Calcium Level 9.7, Corrected Calcium

10.0, Magnesium Level 1.7, Total Bilirubin 0.8, Aspartate Amino Transf 

(AST/SGOT) 16, Alanine Aminotransferase (ALT/SGPT) 11, Alkaline Phosphatase 74, 

Troponin I 0.082H, B-Type Natriuretic Peptide 482.2H, Total Protein 6.5, Albumin

3.6, Thyroid Stimulating Hormone (TSH) 1.64








A/P-Cardiology


Assessment/Admission Diagnosis


PAF with RVR - converted to SR


- first diagnosed on 10/16/18


- Echocardiogram:  10/16/18: LVEF 55-60%, LA mildly to mod dilated, PASP 30-35 

mmHg


- has not been compliant with OAC for several yrs per pt report


- Echo on 5/5/23: LVEF 55-60%, trivial AI, PASP 30-35 mmHg





Mild troponin elevation


- [place] Type 2 MI secondary to a-fib with RVR





HTN





HLD





Chronic back pain/knee pain


- takes OTC Gerry ASA arthritis





Fam h/o early CAD (son)





Discussion and Recomendations


PAF with RVR


- H/O PAF - has converted to SR with controlled rate


- Non-compliance with OAC - start OAC 


- Continue Cardizem CD 240mg daily


- Echocardiogram to eval structure and function


Chest pressure while in a-fib with RVR - resolved after she converted to SR


Monitor lab closely


Replace electrolytes as indicated


Further recs will be based on her hospital course


We would like to thank medical services for this consult











MARY ANN CARROLL MD FACP [facility] CCDS    May 5, 2023 15:34

## 2023-05-05 NOTE — DIAGNOSTIC IMAGING REPORT
INDICATION: Slurred speech, blurred vision.



COMPARISON: 10/16/2018



FINDINGS: The lungs are clear. There is no failure pattern,

effusion or pneumothorax. 



IMPRESSION: Negative. 



Dictated by: 



  Dictated on workstation # [id number]

## 2023-05-05 NOTE — DIAGNOSTIC IMAGING REPORT
INDICATION: Elevated D-dimer and dyspnea. 



CTA of the thorax is performed after bolus intravenous

administration of iodinated contrast. Three-D reformatted images

are produced. Automatic exposure controls were utilized to keep

dose as low as reasonably achievable.



There is good opacification of pulmonary arteries without

intraluminal filling defect. Thoracic aorta is unopacified but is

of normal caliber. Lungs are clear. There is no significant

pleural or pericardial fluid. No pathologically enlarged

adenopathy is seen. There is mild diffuse thoracic spondylosis.



IMPRESSION: No CTA evidence of pulmonary embolism or other acute

abnormality within the thorax.



Dictated by: 



  Dictated on workstation # [id number]

## 2023-05-05 NOTE — ED CARDIAC GENERAL
History of Present Illness


General


Chief Complaint:  Cardiac/General Problems


Stated Complaint:  SOB | DIZZY | BLURRED VISION


Nursing Triage Note:  


ARRIVED VIA AMB TO ROOM 09 WITH COMPLAINTS OF A FIB, SOA, BLURRED VISION 


STARTING YESTERDY.


Source:  patient


Exam Limitations:  no limitations


 (SAGAR ROJAS)





History of Present Illness


Date Seen by Provider:  May 5, 2023


Time Seen by Provider:  11:29


Initial Comments


77-year-old female presents to the ED with complaints of shortness of air, 

blurry vision, dizziness and weakness for the last few days.  She reports that 

she feels as though she is going to pass out when only walking a few steps.  She

reports that last night she noticed her heart fluttering rapidly.  She reports 

she had some chest heaviness with this, also reported chest heaviness this 

morning.  She also reports some abdominal cramping and soft stools, as well as 

nausea.  She states that she has not taken her metoprolol or lisinopril 

hydrochlorothiazide for approximately 2 days due to blood pressure being low.  

She states that when she feels dizzy, she checks her blood pressure and it reads

low, so she does not take medication.  Upon arrival patient's blood pressure was

elevated at 174 systolic, during my assessment systolic blood pressure went down

to 80, repeat blood pressure showed systolic blood pressure 91.  Heart rate 

fluctuating between 120s to 140s in atrial fibrillation.


 (SAGAR ROJAS)





Allergies and Home Medications


Allergies


Coded Allergies:  


     No Known Drug Allergies (Unverified , 10/16/18)





Patient Home Medication List


Home Medication List Reviewed:  Yes


 (SAGAR ROJAS)


Apixaban (Eliquis) 5 Mg Tablet, 5 MG PO BID


   Prescribed by: ИРИНА MARTINEZ on 10/17/18 1201


Metoprolol Succinate (Metoprolol Succinate) 50 Mg Tab.er.24h, 50 MG PO DAILY


   Prescribed by: ИРИНА MARTINEZ on 10/17/18 1201





Review of Systems


Review of Systems


Constitutional:  see HPI (SAGAR ROJAS)





Past Medical-Social-Family Hx


Patient Social History


Tobacco Use?:  No


Substance use?:  No


Alcohol Use?:  No


 (SAGAR ROJAS)





Immunizations Up To Date


Second COVID19 Vaccination Shaun:  UNKNOWN


COVID19 Vaccine :  UNKNOWN


 (SAGAR ROJAS)





Seasonal Allergies


Seasonal Allergies:  No


 (SAGAR ROJAS)





Past Medical History


Surgeries:  Yes (anterior-posterior pelvic repair)


Hysterectomy


Respiratory:  No


Cardiac:  No


Neurological:  No


GYN History:  Hysterectomy


Genitourinary:  No


Gastrointestinal:  No


Musculoskeletal:  No


Endocrine:  No


HEENT:  No


Cancer:  No


Psychosocial:  No


Integumentary:  No


 (SAGAR ROJAS)





Family Medical History





Colon cancer


  19 MOTHER


Hypertension


  19 MOTHER


  G8 BROTHER





Physical Exam


Vital Signs





Vital Signs - First Documented








 23





 11:00


 


Temp 36.3


 


Pulse 144


 


Resp 16


 


B/P (MAP) 174/158 (163)


 


Pulse Ox 97


 


O2 Delivery Room Air








 (CORDELIA GARCIA MD)


Vital Signs


Capillary Refill : Less Than 3 Seconds 


 (SAGAR ROJAS)


Height, Weight, BMI


Height: 5'4.00"


Weight: 189lbs. 3.2oz. 85.188457lx; 31.00 BMI


Method:Stated


General Appearance:  No Apparent Distress, WD/WN


Neck:  Non Tender, Supple


Respiratory:  Lungs Clear, Normal Breath Sounds, No Accessory Muscle Use, No 

Respiratory Distress


Cardiovascular:  Regular Rate, Rhythm


Extremity:  Normal Range of Motion


Neurologic/Psychiatric:  Alert, Normal Mood/Affect


Skin:  Normal Color, Warm/Dry (SAGAR ROJAS)





Progress/Results/Core Measures


Results/Orders


Lab Results





Laboratory Tests








Test


 23


11:25 Range/Units


 


 


White Blood Count


 7.7 


 4.3-11.0


10^3/uL


 


Red Blood Count


 4.35 


 3.80-5.11


10^6/uL


 


Hemoglobin 13.9  11.5-16.0  g/dL


 


Hematocrit 41  35-52  %


 


Mean Corpuscular Volume 94  80-99  fL


 


Mean Corpuscular Hemoglobin 32  25-34  pg


 


Mean Corpuscular Hemoglobin


Concent 34 


 32-36  g/dL





 


Red Cell Distribution Width 13.0  10.0-14.5  %


 


Platelet Count


 247 


 130-400


10^3/uL


 


Mean Platelet Volume 9.2  9.0-12.2  fL


 


Immature Granulocyte % (Auto) 0   %


 


Neutrophils (%) (Auto) 67  42-75  %


 


Lymphocytes (%) (Auto) 25  12-44  %


 


Monocytes (%) (Auto) 6  0-12  %


 


Eosinophils (%) (Auto) 1  0-10  %


 


Basophils (%) (Auto) 0  0-10  %


 


Neutrophils # (Auto)


 5.2 


 1.8-7.8


10^3/uL


 


Lymphocytes # (Auto)


 1.9 


 1.0-4.0


10^3/uL


 


Monocytes # (Auto)


 0.5 


 0.0-1.0


10^3/uL


 


Eosinophils # (Auto)


 0.1 


 0.0-0.3


10^3/uL


 


Basophils # (Auto)


 0.0 


 0.0-0.1


10^3/uL


 


Immature Granulocyte # (Auto)


 0.0 


 0.0-0.1


10^3/uL


 


Prothrombin Time 14.0  12.2-14.7  SEC


 


INR Comment 1.0  0.8-1.4  


 


Activated Partial


Thromboplast Time 30 


 24-35  SEC





 


D-Dimer


 0.94 H


 0.00-0.49


UG/ML


 


Sodium Level 139  135-145  MMOL/L


 


Potassium Level 4.1  3.6-5.0  MMOL/L


 


Chloride Level 107    MMOL/L


 


Carbon Dioxide Level 20 L 21-32  MMOL/L


 


Anion Gap 12  5-14  MMOL/L


 


Blood Urea Nitrogen 22 H 7-18  MG/DL


 


Creatinine


 0.87 


 0.60-1.30


MG/DL


 


Estimat Glomerular Filtration


Rate 68 


  





 


BUN/Creatinine Ratio 25   


 


Glucose Level 105    MG/DL


 


Calcium Level 9.7  8.5-10.1  MG/DL


 


Corrected Calcium 10.0  8.5-10.1  MG/DL


 


Magnesium Level 1.7  1.6-2.4  MG/DL


 


Total Bilirubin 0.8  0.1-1.0  MG/DL


 


Aspartate Amino Transf


(AST/SGOT) 16 


 5-34  U/L





 


Alanine Aminotransferase


(ALT/SGPT) 11 


 0-55  U/L





 


Alkaline Phosphatase 74    U/L


 


Troponin I 0.082 H <0.028  NG/ML


 


B-Type Natriuretic Peptide 482.2 H <100.0  PG/ML


 


Total Protein 6.5  6.4-8.2  GM/DL


 


Albumin 3.6  3.2-4.5  GM/DL


 


Thyroid Stimulating Hormone


(TSH) 1.64 


 0.35-4.94


UIU/ML





 (CORDELIA GARCIA MD)


Vital Signs/I&O











 5/5/23





 11:00


 


Temp 36.3


 


Pulse 144


 


Resp 16


 


B/P (MAP) 174/158 (163)


 


Pulse Ox 97


 


O2 Delivery Room Air














 23





 00:00


 


Intake Total 500 ml


 


Balance 500 ml





 (CORDELIA GARCIA MD)








Blood Pressure Mean:                    163











Progress


Progress Note :  


   Time:  11:43


Progress Note


Patient seen and evaluated, resting comfortably in bed, no acute distress.  

Based on exam and symptoms, work-up initiated including CBC, CMP, troponin, 

magnesium, coags, BNP, D-dimer, chest x-ray, EKG.





1240 Labs and x-ray reviewed.  CBC grossly normal.  CMP shows slightly decreased

CO2 20, slightly elevated BUN 22, troponin elevated 0.082, BNP elevated 482.2, 

magnesium normal 1.7, coags normal.  D-dimer elevated 0.94. Chest x-ray negative

for acute findings.  CT angio chest PE rule out ordered for elevated D-dimer.  


Patient's heart rhythm converted to normal sinus prior to going to CT.  Patient 

assessed after CT, states she feels a lot better, states she did not get dizzy 

when she got up to the wheelchair to go to CT.  Patient's heart rate currently 

72, blood pressure normal at 133/87.  EKG repeated, shows normal sinus rhythm.





1302 CT angio negative for PE.  I called and spoke with Dr. Johnson, cardiology. 

He recommends admission due to elevated troponin.  He would like patient started

on Cardizem 240 mg now and daily as well as Eliquis 5 mg now and twice daily.  

He would also like an echo to be completed during patient's stay.





1314: Spoke with Dr. Diaz, hospitalist, regarding patient.  She will admit 

patient for observation to cardiac stepdown.  She will place admission orders.


 (SAGAR ROJAS)


Initial ECG Impression Date:  May 5, 2023


Initial ECG Impression Time:  11:14


Initial ECG Rate:  145


Initial ECG Rhythm:  A Fib/Flutter


Initial ECG Intervals:  Normal


Initial ECG Impression:  Atrial Fibrillation w/RVR


EKG :  


   EKG Time:  12:21


   Rhythm:  Normal Sinus


   Intervals:  Normal


   ECG Comparisson:  Changed


   ECG Impression:  Nonspecific Changes


 (SAGAR ROJAS)





Diagnostic Imaging





   Diagonstic Imaging:  Xray


   Plain Films/CT/US/NM/MRI:  chest


Comments


                 [facility]


                                [place], [place]





NAME:   MEHDI WAHL


MED REC#:   [id number]


ACCOUNT#:   [id number]


PT STATUS:   REG ER


:   1945


PHYSICIAN:   SAGAR ROJAS


ADMIT DATE:   23/ER


                                   ***Draft***


Date of Exam:23





CHEST 1 VIEW, AP/PA ONLY








INDICATION: Slurred speech, blurred vision.





COMPARISON: 10/16/2018





FINDINGS: The lungs are clear. There is no failure pattern,


effusion or pneumothorax. 





IMPRESSION: Negative. 





  Dictated on workstation # [id number]








Dict:   23 1202


Trans:   23 1204


ACB [id number]





Interpreted by:     SCOTT RENDON


Electronically signed by:








   [facility]:  CT


   Plain Films/CT/US/NM/MRI:  chest


Comments


                 [facility]


                                [place], [place]





NAME:   MEHDI WAHL


MED REC#:   [id number]


ACCOUNT#:   [id number]


PT STATUS:   REG ER


:   1945


PHYSICIAN:   SAGAR ROJAS


ADMIT DATE:   23/ER


                                   ***Draft***


Date of Exam:23





CT ANGIO CHEST W (R/O PE)








INDICATION: Elevated D-dimer and dyspnea. 





CTA of the thorax is performed after bolus intravenous


administration of iodinated contrast. Three-D reformatted images


are produced. Automatic exposure controls were utilized to keep


dose as low as reasonably achievable.





There is good opacification of pulmonary arteries without


intraluminal filling defect. Thoracic aorta is unopacified but is


of normal caliber. Lungs are clear. There is no significant


pleural or pericardial fluid. No pathologically enlarged


adenopathy is seen. There is mild diffuse thoracic spondylosis.





IMPRESSION: No CTA evidence of pulmonary embolism or other acute


abnormality within the thorax.





  Dictated on workstation # [id number]








Dict:   23 1244


Trans:   23 1251


ACB [id number]





Interpreted by:     SCOTT TORRES MD


Electronically signed by:  


 (SAGAR ROJAS)





Departure


Communication (Admissions)


Time/Spoke to Admitting Phy:  13:14


Dr. Leon, hospitalist.  See progress note.


Time/Spoke to Consulting Phy:  13:02


Dr. Johnson, cardiology, see progress note.


 (SAGAR ROJAS)





Impression





   Primary Impression:  


   Atrial fibrillation with RVR


   Additional Impression:  


   Elevated troponin


Disposition:   ADMITTED AS INPATIENT


Condition:  Stable





Admissions


Decision to Admit Reason:  Admit from ER (General)


Decision to Admit/Date:  May 5, 2023


Time/Decision to Admit Time:  13:02


 (SAGAR ROJAS)





Departure-Patient Inst.


Referrals:  


NO,LOCAL PHYSICIAN (PCP)


Primary Care Physician








ATTENDING PHYSICIAN NOTE:


I was physically present as attending physician in the emergency department 

during the care of this patient, but I was not directly involved in the decision

making or delivery of care for this patient. 


 (CORDELIA GARCIA MD)











SAGAR ROJAS         May 5, 2023 11:44


CORDELIA GARCIA MD         May 6, 2023 06:27

## 2023-05-05 NOTE — HISTORY & PHYSICAL
History of Present Illness


HPI/Chief Complaint


CC: AF RVR





HPI: This is a 77yoWF clinic patient of [facility] who has a h/o HTN and AF who 

presented to the ER with palpitations and vague neuro complaints who was found 

to have AF RVR so Cardiology recommended OAC and Cardizem PO after she converted

in the ER after IV meds were given. Currently she is doing better and is less 

dizzy. She has never had a sleep study and I suspect ONESIMO so I recommended that 

as outpatient.


Source:  patient


Exam Limitations:  no limitations


Date Seen


5/5/23


Time Seen by a Provider:  18:00


Attending Physician


No,Local Physician


PCP


Admitting Physician:


 








Attending Physician:


Referring Physician





Date of Admission








Home Medications & Allergies


Home Medications


Reviewed patient Home Medication Reconciliation performed by pharmacy medication

reconciliations technician and/or nursing.


Patients Allergies have been reviewed.





Allergies





Allergies


Coded Allergies


  No Known Drug Allergies ([id number]/16/18)








Past Medical-Social-Family Hx


Past Med/Social Hx:  Reviewed Nursing Past Med/Soc Hx, Reviewed and Corrections 

made


Patient Social History


Marrital Status:  single


Employed/Student:  retired


Alcohol Use:  Denies Use


Smoking Status:  Never a Smoker





Seasonal Allergies


Seasonal Allergies:  No





Past Medical History


Surgeries:  Hysterectomy


Cardiac:  Atrial Fibrillation, High Cholesterol, Hypertension


Hysterectomy


Endocrine:  Diabetes, Non-Insulin dep





Family History





Colon cancer


  19 MOTHER


Hypertension


  19 MOTHER


  G8 BROTHER





Review of Systems


Constitutional:  see HPI, malaise, weakness


Cardiovascular:  palpitations





Physical Exam


Physical Exam


Vital Signs





Vital Signs - First Documented








 5/5/23





 11:00


 


Temp 36.3


 


Pulse 144


 


Resp 16


 


B/P (MAP) 174/158 (163)


 


Pulse Ox 97


 


O2 Delivery Room Air





Capillary Refill : Less Than 3 Seconds


Height, Weight, BMI


Height: 5'4.00"


Weight: 189lbs. 3.2oz. 85.522326kg; 31.00 BMI


Method:Stated


General Appearance:  No Apparent Distress, WD/WN, Chronically ill


Neck:  Non Tender, Supple


Respiratory:  Lungs Clear, Normal Breath Sounds, No Accessory Muscle Use, No 

Respiratory Distress


Cardiovascular:  Regular Rate, Rhythm


Extremity:  Normal Range of Motion


Neurologic/Psychiatric:  Alert, Normal Mood/Affect


Skin:  Normal Color, Warm/Dry





Results


Results/Procedures


Labs


Laboratory Tests


5/5/23 11:25








5/6/23 04:20








Patient resulted labs reviewed.





Assessment/Plan


Admission Diagnosis


Assessment:


AF RVR


HTN


HLP


DM


Suspected ONESIMO





Plan:


OAC


Cards consult


Admission Status:  Observation











RICHARD TAFOYA DO                 May 5, 2023 13:24

## 2023-05-05 NOTE — CONSULTATION-CARDIOLOGY
HPI-Cardiology


Cardiology Consultation:


Date of Consultation


23


Time Seen by a Provider:  13:35


Date of Admission


23


Attending Physician


No,Local Physician


Admitting Physician


Admitting Physician:


 








Attending Physician:


Consulting Physician


SHAHLA REYES





HPI:


Chief Complaint:


PAF


Ms. Loja is a 77 yr old female being admitted from the ED to [facility].  She has 

been seen in the ED.  She reports increasing weakness, fatigue and SOB for the 

last several weeks.  She reports she was having dizziness with episodes of 

blurred vision.  She reports she would check her blood pressure at home and her 

BP would be low.  She states last night she woke up from sleep with palpitations

and some heaviness in her chest, but she was able to go to sleep.  She woke up 

this morning with continued symptoms of palpitations, dizziness, blurred vision 

and heaviness in her chest.  She came to the ED and was found to be in a-fib 

with RVR.  She has since converted to SR.  She reports he symptoms have 

resolved.  She reports she usually take Toprol at home, but has not taken it for

several days d/t low BP.  No c/o LE swelling.  No c/o n/v/d.  No c/o fever or 

chills.





Review of Systems-Cardiology


Review of Systems


Constitutional:  No chills, No fever; lightheadedness


Eyes:  As described under HPI


Ears/Nose/Throat:  No epistaxis, No recent hearing loss


Respiratory:  As described under HPI


Cardiovascular:  As described under HPI


Gastrointestinal:  No constipation, No diarrhea, No nausea, No vomiting


Genitourinary:  No dysuria


Musculoskeletal:  As describe under HPI


Skin:  No rash on exposed areas, No ulcerations on exposed areas


Psychiatric/Neurological:  No anxiety, No depression, No seizure, No focal 

weakness, No syncope


Hematologic:  No bleeding abnormalities





PMH-Social-Family Hx


Patient Social History


Have you traveled recently?:  No


Alcohol Use?:  No





Past Medical History


PMH


As described under Assessment.





Family Medical History


Family Medical History:  


She reports her mother and brother have HTN.


She reports a son who has CAD.


Family History:  


Colon cancer


  19 MOTHER


Hypertension


  19 MOTHER


  G8 BROTHER





Allergies and Home Medications


Allergies


Coded Allergies:  


     No Known Drug Allergies (Unverified , 10/16/18)





Patient Home Medication List


Diltiazem HCl (Diltiazem 24Hr ER) 240 Mg Cap.er.24h, 240 MG PO DAILY


   Prescribed by: RICHARD TAFOYA on 23 1148


Lisinopril (Lisinopril) 10 Mg Tablet, 10 MG PO DAILY PRN for elevated bp


   Prescribed by: RICHARD TAFOYA on 23 1148


Rivaroxaban (Xarelto Tablet) 20 Mg Tablet, 20 MG PO DAILY@1700


   Prescribed by: MARY ANN CARROLL on 23 1204


Discontinued Medications


Apixaban (Eliquis) 5 Mg Tablet, 5 MG PO BID


   Prescribed by: ИРИНА MARTINEZ on 10/17/18 120


   Last Action: Discontinued


Metoprolol Succinate (Metoprolol Succinate) 50 Mg Tab.er.24h, 50 MG PO DAILY


   Prescribed by: ИРИНА MARTINEZ on 10/17/18 120





Physical Exam-Cardiology


Physical Exam


Vital Signs/I&O


Capillary Refill : Less Than 3 Seconds


Constitutional:  AAO x 3, well-developed, well-nourished


HEENT:  PERRL, hearing is well preserved, oral hygience is good


Neck:  No carotid bruit; carotid pulses are 2 + bilaterally


Respiratory:  No accessory muscle use, No respiratory distress; chest expansion 

is symmetric, chest is bilaterally symmetric, lungs clear to auscultation


Cardiovascular:  regular rate-rhythm; No JVD; S1 and S2


Gastrointestinal:  No tender; soft, round; No guarding; audible bowel sounds


Extremities:  no lower extremity edema bilateral


Neurologic/Psychiatric:  grossly intact (moves all extremities)


Skin:  No rash on exposed areas, No ulcerations on exposed areas





Data Review


Labs








Radiology


NAME:   MEHDI LOJA


MED REC#:   [id number]


ACCOUNT#:   [id number]


PT STATUS:   REG ER


:   1945


PHYSICIAN:   SAGAR ROJAS


ADMIT DATE:   23/ER


***Draft***


Date of Exam:23





CHEST 1 VIEW, AP/PA ONLY








INDICATION: Slurred speech, blurred vision.





COMPARISON: 10/16/2018





FINDINGS: The lungs are clear. There is no failure pattern,


effusion or pneumothorax. 





IMPRESSION: Negative. 





  Dictated on workstation # [id number]








Dict:   23 1202


Trans:   23 120


[facility] [id number]





Interpreted by:     SCOTT RENDON


Electronically signed by:





A/P-Cardiology


Assessment/Admission Diagnosis


PAF with RVR - converted to SR


- first diagnosed on 10/16/18


- Echocardiogram:  10/16/18: LVEF 55-60%, LA mildly to mod dilated, PASP 30-35 

mmHg


- has not been compliant with OAC for several yrs per pt report





Mild troponin elevation


- [place] Type 2 MI secondary to a-fib with RVR





HTN





HLD





Chronic back pain/knee pain


- takes OTC Gerry ASA arthritis





Fam h/o early CAD (son)





Discussion and Recomendations


PAF with RVR


- H/O PAF - has converted to SR with controlled rate


- Non-compliance with OAC - start OAC 


- Continue Cardizem CD 240mg daily


- Echocardiogram to eval structure and function


Chest pressure while in a-fib with RVR - resolved after she converted to SR


Monitor lab closely


Replace electrolytes as indicated


Further recs will be based on her hospital course


We would like to thank medical services for this consult











SHAHLA SPAULDING            May 5, 2023 13:33

## 2023-05-06 VITALS — SYSTOLIC BLOOD PRESSURE: 161 MMHG | DIASTOLIC BLOOD PRESSURE: 99 MMHG

## 2023-05-06 VITALS — DIASTOLIC BLOOD PRESSURE: 68 MMHG | SYSTOLIC BLOOD PRESSURE: 153 MMHG

## 2023-05-06 VITALS — SYSTOLIC BLOOD PRESSURE: 168 MMHG | DIASTOLIC BLOOD PRESSURE: 91 MMHG

## 2023-05-06 VITALS — DIASTOLIC BLOOD PRESSURE: 84 MMHG | SYSTOLIC BLOOD PRESSURE: 160 MMHG

## 2023-05-06 LAB
ALBUMIN SERPL-MCNC: 3.5 GM/DL (ref 3.2–4.5)
ALP SERPL-CCNC: 74 U/L (ref 40–136)
ALT SERPL-CCNC: 10 U/L (ref 0–55)
BASOPHILS # BLD AUTO: 0 10^3/UL (ref 0–0.1)
BASOPHILS NFR BLD AUTO: 1 % (ref 0–10)
BILIRUB SERPL-MCNC: 0.8 MG/DL (ref 0.1–1)
BUN/CREAT SERPL: 29
CALCIUM SERPL-MCNC: 9.1 MG/DL (ref 8.5–10.1)
CHLORIDE SERPL-SCNC: 111 MMOL/L (ref 98–107)
CO2 SERPL-SCNC: 20 MMOL/L (ref 21–32)
CREAT SERPL-MCNC: 0.77 MG/DL (ref 0.6–1.3)
EOSINOPHIL # BLD AUTO: 0.1 10^3/UL (ref 0–0.3)
EOSINOPHIL NFR BLD AUTO: 2 % (ref 0–10)
GFR SERPLBLD BASED ON 1.73 SQ M-ARVRAT: 78 ML/MIN
GLUCOSE SERPL-MCNC: 91 MG/DL (ref 70–105)
HCT VFR BLD CALC: 36 % (ref 35–52)
HGB BLD-MCNC: 12.5 G/DL (ref 11.5–16)
LYMPHOCYTES # BLD AUTO: 2 10^3/UL (ref 1–4)
LYMPHOCYTES NFR BLD AUTO: 33 % (ref 12–44)
MANUAL DIFFERENTIAL PERFORMED BLD QL: NO
MCH RBC QN AUTO: 32 PG (ref 25–34)
MCHC RBC AUTO-ENTMCNC: 34 G/DL (ref 32–36)
MCV RBC AUTO: 94 FL (ref 80–99)
MONOCYTES # BLD AUTO: 0.4 10^3/UL (ref 0–1)
MONOCYTES NFR BLD AUTO: 6 % (ref 0–12)
NEUTROPHILS # BLD AUTO: 3.6 10^3/UL (ref 1.8–7.8)
NEUTROPHILS NFR BLD AUTO: 59 % (ref 42–75)
PLATELET # BLD: 201 10^3/UL (ref 130–400)
PMV BLD AUTO: 9 FL (ref 9–12.2)
POTASSIUM SERPL-SCNC: 3.9 MMOL/L (ref 3.6–5)
PROT SERPL-MCNC: 6 GM/DL (ref 6.4–8.2)
SODIUM SERPL-SCNC: 142 MMOL/L (ref 135–145)
WBC # BLD AUTO: 6.1 10^3/UL (ref 4.3–11)

## 2023-05-06 RX ADMIN — SENNOSIDES SCH MG: 8.6 TABLET, FILM COATED ORAL at 08:35

## 2023-05-06 RX ADMIN — DOCUSATE SODIUM SCH MG: 100 CAPSULE ORAL at 08:35

## 2023-05-06 RX ADMIN — SODIUM CHLORIDE SCH MLS/HR: 900 INJECTION, SOLUTION INTRAVENOUS at 06:52

## 2023-05-06 RX ADMIN — ACETAMINOPHEN PRN MG: 325 TABLET ORAL at 11:45

## 2023-05-06 NOTE — DISCHARGE SUMMARY
Diagnosis/Chief Complaint


Date of Admission


May 5, 2023 at 14:12


Date of Discharge





Discharge Date:  May 6, 2023


Discharge Diagnosis


AF RVR


CAD


Suspected ONESIMO





Discharge Summary


Discharge Physical Examination


Allergies:  


Coded Allergies:  


     No Known Drug Allergies (Unverified , 10/16/18)


Vitals & I&Os





Vital Signs








  Date Time  Temp Pulse Resp B/P (MAP) Pulse Ox O2 Delivery O2 Flow Rate FiO2


 


5/6/23 11:36 36.4 72 10 161/99 (119) 96 Room Air  








General Appearance:  Alert, Oriented X3, Cooperative


Respiratory:  Clear to Auscultation


Cardiovascular:  Regular Rate


Psych/Mental Status:  Mental Status NL





Hospital Course


Was the Problem List Reviewed?:  Yes


Short course after admitted for AF RVR which resolved and converted to NSR in ER

so she was placed on OAC of which she was non-compliant with Eliquis at home and

Dr Johnson evaluated her to be safe for DC on Xarelto and DC BB and ASA and I rec

ONESIMO sleep study and she was DC in improved condition on Cardizem


Labs (last 24 hrs)


Laboratory Tests


5/5/23 11:25: 


White Blood Count 7.7, Red Blood Count 4.35, Hemoglobin 13.9, Hematocrit 41, 

Mean Corpuscular Volume 94, Mean Corpuscular Hemoglobin 32, Mean Corpuscular 

Hemoglobin Concent 34, Red Cell Distribution Width 13.0, Platelet Count 247, 

Mean Platelet Volume 9.2, Immature Granulocyte % (Auto) 0, Neutrophils (%) 

(Auto) 67, Lymphocytes (%) (Auto) 25, Monocytes (%) (Auto) 6, Eosinophils (%) 

(Auto) 1, Basophils (%) (Auto) 0, Neutrophils # (Auto) 5.2, Lymphocytes # (Auto)

1.9, Monocytes # (Auto) 0.5, Eosinophils # (Auto) 0.1, Basophils # (Auto) 0.0, 

Immature Granulocyte # (Auto) 0.0, Prothrombin Time 14.0, INR Comment 1.0, 

Activated Partial Thromboplast Time 30, D-Dimer 0.94H, Sodium Level 139, P

otassium Level 4.1, Chloride Level 107, Carbon Dioxide Level 20L, Anion Gap 12, 

Blood Urea Nitrogen 22H, Creatinine 0.87, Estimat Glomerular Filtration Rate 68,

BUN/Creatinine Ratio 25, Glucose Level 105, Calcium Level 9.7, Corrected Calcium

10.0, Magnesium Level 1.7, Total Bilirubin 0.8, Aspartate Amino Transf 

(AST/SGOT) 16, Alanine Aminotransferase (ALT/SGPT) 11, Alkaline Phosphatase 74, 

Troponin I 0.082H, B-Type Natriuretic Peptide 482.2H, Total Protein 6.5, Albumin

3.6, Thyroid Stimulating Hormone (TSH) 1.64


5/6/23 04:20: 


White Blood Count 6.1, Red Blood Count 3.87, Hemoglobin 12.5, Hematocrit 36, 

Mean Corpuscular Volume 94, Mean Corpuscular Hemoglobin 32, Mean Corpuscular 

Hemoglobin Concent 34, Red Cell Distribution Width 13.2, Platelet Count 201, 

Mean Platelet Volume 9.0, Immature Granulocyte % (Auto) 0, Neutrophils (%) 

(Auto) 59, Lymphocytes (%) (Auto) 33, Monocytes (%) (Auto) 6, Eosinophils (%) (A

uto) 2, Basophils (%) (Auto) 1, Neutrophils # (Auto) 3.6, Lymphocytes # (Auto) 

2.0, Monocytes # (Auto) 0.4, Eosinophils # (Auto) 0.1, Basophils # (Auto) 0.0, 

Immature Granulocyte # (Auto) 0.0, Sodium Level 142, Potassium Level 3.9, 

Chloride Level 111H, Carbon Dioxide Level 20L, Anion Gap 11, Blood Urea Nitrogen

22H, Creatinine 0.77, Estimat Glomerular Filtration Rate 78, BUN/Creatinine 

Ratio 29, Glucose Level 91, Calcium Level 9.1, Corrected Calcium 9.5, Total 

Bilirubin 0.8, Aspartate Amino Transf (AST/SGOT) 16, Alanine Aminotransferase 

(ALT/SGPT) 10, Alkaline Phosphatase 74, Total Protein 6.0L, Albumin 3.5





Pending Labs


Laboratory Tests


5/5/23 11:25: 


White Blood Count 7.7, Red Blood Count 4.35, Hemoglobin 13.9, Hematocrit 41, 

Mean Corpuscular Volume 94, Mean Corpuscular Hemoglobin 32, Mean Corpuscular 

Hemoglobin Concent 34, Red Cell Distribution Width 13.0, Platelet Count 247, 

Mean Platelet Volume 9.2, Immature Granulocyte % (Auto) 0, Neutrophils (%) 

(Auto) 67, Lymphocytes (%) (Auto) 25, Monocytes (%) (Auto) 6, Eosinophils (%) 

(Auto) 1, Basophils (%) (Auto) 0, Neutrophils # (Auto) 5.2, Lymphocytes # (Auto)

1.9, Monocytes # (Auto) 0.5, Eosinophils # (Auto) 0.1, Basophils # (Auto) 0.0, 

Immature Granulocyte # (Auto) 0.0, Prothrombin Time 14.0, INR Comment 1.0, Ac

tivated Partial Thromboplast Time 30, D-Dimer 0.94, Sodium Level 139, Potassium 

Level 4.1, Chloride Level 107, Carbon Dioxide Level 20, Anion Gap 12, Blood Urea

Nitrogen 22, Creatinine 0.87, Estimat Glomerular Filtration Rate 68, 

BUN/Creatinine Ratio 25, Glucose Level 105, Calcium Level 9.7, Corrected Calcium

10.0, Magnesium Level 1.7, Total Bilirubin 0.8, Aspartate Amino Transf (A

ST/SGOT) 16, Alanine Aminotransferase (ALT/SGPT) 11, Alkaline Phosphatase 74, 

Troponin I 0.082, B-Type Natriuretic Peptide 482.2, Total Protein 6.5, Albumin 

3.6, Thyroid Stimulating Hormone (TSH) 1.64


5/6/23 04:20: 


White Blood Count 6.1, Red Blood Count 3.87, Hemoglobin 12.5, Hematocrit 36, 

Mean Corpuscular Volume 94, Mean Corpuscular Hemoglobin 32, Mean Corpuscular 

Hemoglobin Concent 34, Red Cell Distribution Width 13.2, Platelet Count 201, 

Mean Platelet Volume 9.0, Immature Granulocyte % (Auto) 0, Neutrophils (%) 

(Auto) 59, Lymphocytes (%) (Auto) 33, Monocytes (%) (Auto) 6, Eosinophils (%) 

(Auto) 2, Basophils (%) (Auto) 1, Neutrophils # (Auto) 3.6, Lymphocytes # (Auto)

2.0, Monocytes # (Auto) 0.4, Eosinophils # (Auto) 0.1, Basophils # (Auto) 0.0, 

Immature Granulocyte # (Auto) 0.0, Sodium Level 142, Potassium Level 3.9, 

Chloride Level 111, Carbon Dioxide Level 20, Anion Gap 11, Blood Urea Nitrogen 

22, Creatinine 0.77, Estimat Glomerular Filtration Rate 78, BUN/Creatinine Ratio

29, Glucose Level 91, Calcium Level 9.1, Corrected Calcium 9.5, Total Bilirubin 

0.8, Aspartate Amino Transf (AST/SGOT) 16, Alanine Aminotransferase (ALT/SGPT) 

10, Alkaline Phosphatase 74, Total Protein 6.0, Albumin 3.5








Discharge


Home Medications:





Active Scripts


Active


Xarelto Tablet (Rivaroxaban) 20 Mg Tablet 20 Mg PO DAILY@1700


Lisinopril 10 Mg Tablet 10 Mg PO DAILY PRN 30 Days


Diltiazem 24Hr ER (Diltiazem HCl) 240 Mg Cap.er.24h 240 Mg PO DAILY





Instructions to patient/family


Please see electronic discharge instructions given to patient.











RICHARD TAFOYA DO                 May 6, 2023 12:27

## 2023-05-06 NOTE — PROGRESS NOTE - CARDIOLOGY
Cardiology SOAP Progress Note


Subjective:


No cp or palp or syncope or shortness of breath


No n/v/d


No focal or gen weakness


No swelling


Feels well and wishes to go home





Objective:


I&O/Vital Signs











 5/6/23 5/6/23 5/6/23 5/6/23





 00:00 01:00 04:00 04:00


 


Temp 36.3  36.4 


 


Pulse 59 62  59


 


Resp 20   13


 


B/P (MAP) 153/68 (96)  168/91 (116) 


 


Pulse Ox 96   95


 


O2 Delivery Room Air   Room Air


 


    





 5/6/23 5/6/23 5/6/23 





 07:00 07:44 08:00 


 


Temp  36.5  


 


Pulse 78 65  


 


Resp  15  


 


B/P (MAP)  160/84 (109)  


 


Pulse Ox  94  


 


O2 Delivery  Room Air Room Air 














 5/6/23





 00:00


 


Intake Total 1420 ml


 


Balance 1420 ml








Weight (Pounds):  189


Weight (Ounces):  3.2


Weight (Calculated Kilograms):  85.413847


Constitutional:  AAO x 3, well-developed, well-nourished


Respiratory:  No accessory muscle use, No respiratory distress; chest expansion 

is symmetric, chest is bilaterally symmetric, lungs clear to auscultation


Cardiovascular:  regular rate-rhythm; No JVD; S1 and S2


Gastrointestional:  No tender; soft, round; No guarding; audible bowel sounds


Extremities:  no lower extremity edema bilateral


Neurologic/Psychiatric:  grossly intact (moves all extremities)


Skin:  No rash on exposed areas, No ulcerations on exposed areas





Results/Procedures:


Labs


Laboratory Tests


5/5/23 11:25: 


White Blood Count 7.7, Red Blood Count 4.35, Hemoglobin 13.9, Hematocrit 41, 

Mean Corpuscular Volume 94, Mean Corpuscular Hemoglobin 32, Mean Corpuscular 

Hemoglobin Concent 34, Red Cell Distribution Width 13.0, Platelet Count 247, 

Mean Platelet Volume 9.2, Immature Granulocyte % (Auto) 0, Neutrophils (%) 

(Auto) 67, Lymphocytes (%) (Auto) 25, Monocytes (%) (Auto) 6, Eosinophils (%) 

(Auto) 1, Basophils (%) (Auto) 0, Neutrophils # (Auto) 5.2, Lymphocytes # (Auto)

1.9, Monocytes # (Auto) 0.5, Eosinophils # (Auto) 0.1, Basophils # (Auto) 0.0, 

Immature Granulocyte # (Auto) 0.0, Prothrombin Time 14.0, INR Comment 1.0, 

Activated Partial Thromboplast Time 30, D-Dimer 0.94H, Sodium Level 139, 

Potassium Level 4.1, Chloride Level 107, Carbon Dioxide Level 20L, Anion Gap 12,

Blood Urea Nitrogen 22H, Creatinine 0.87, Estimat Glomerular Filtration Rate 68,

BUN/Creatinine Ratio 25, Glucose Level 105, Calcium Level 9.7, Corrected Calcium

10.0, Magnesium Level 1.7, Total Bilirubin 0.8, Aspartate Amino Transf 

(AST/SGOT) 16, Alanine Aminotransferase (ALT/SGPT) 11, Alkaline Phosphatase 74, 

Troponin I 0.082H, B-Type Natriuretic Peptide 482.2H, Total Protein 6.5, Albumin

3.6, Thyroid Stimulating Hormone (TSH) 1.64


5/6/23 04:20: 


White Blood Count 6.1, Red Blood Count 3.87, Hemoglobin 12.5, Hematocrit 36, 

Mean Corpuscular Volume 94, Mean Corpuscular Hemoglobin 32, Mean Corpuscular 

Hemoglobin Concent 34, Red Cell Distribution Width 13.2, Platelet Count 201, 

Mean Platelet Volume 9.0, Immature Granulocyte % (Auto) 0, Neutrophils (%) 

(Auto) 59, Lymphocytes (%) (Auto) 33, Monocytes (%) (Auto) 6, Eosinophils (%) 

(Auto) 2, Basophils (%) (Auto) 1, Neutrophils # (Auto) 3.6, Lymphocytes # (Auto)

2.0, Monocytes # (Auto) 0.4, Eosinophils # (Auto) 0.1, Basophils # (Auto) 0.0, 

Immature Granulocyte # (Auto) 0.0, Sodium Level 142, Potassium Level 3.9, 

Chloride Level 111H, Carbon Dioxide Level 20L, Anion Gap 11, Blood Urea Nitrogen

22H, Creatinine 0.77, Estimat Glomerular Filtration Rate 78, BUN/Creatinine 

Ratio 29, Glucose Level 91, Calcium Level 9.1, Corrected Calcium 9.5, Total 

Bilirubin 0.8, Aspartate Amino Transf (AST/SGOT) 16, Alanine Aminotransferase 

(ALT/SGPT) 10, Alkaline Phosphatase 74, Total Protein 6.0L, Albumin 3.5





Laboratory Tests


5/5/23 11:25








5/6/23 04:20











A/P:


Assessment:


PAF with RVR - converted to SR


- first diagnosed on 10/16/18


- Echocardiogram:  10/16/18: LVEF 55-60%, LA mildly to mod dilated, PASP 30-35 

mmHg


- has not been compliant with OAC for several yrs per pt report


- Echo on 5/5/23: LVEF 55-60%, trivial AI, PASP 30-35 mmHg





Mild troponin elevation


- Type 2 MI secondary to a-fib with RVR





HTN





HLD





Chronic back pain/knee pain


- takes OTC Gerry ASA arthritis





Fam h/o early CAD (son)


Plan:





* I had a detailed discussion with her regarding her CV issues


* She feels well and insists on going home and does not wish to have any further

  testing at this time


* Agrees to take Cardizem CD for vent rate control and Xarelto for stroke 

  prophylaxis. ASA to stop if takes Xarelto


* Outpt f/u advised


* Risk factor modification reviewed and discussed. Questions answered











MARY ANN CARROLL MD FACP FAC CCDS    May 6, 2023 11:12

## 2023-10-10 ENCOUNTER — HOSPITAL ENCOUNTER (OUTPATIENT)
Dept: HOSPITAL 75 - CARD | Age: 78
End: 2023-10-10
Attending: INTERNAL MEDICINE
Payer: MEDICARE

## 2023-10-10 VITALS — DIASTOLIC BLOOD PRESSURE: 113 MMHG | SYSTOLIC BLOOD PRESSURE: 160 MMHG

## 2023-10-10 DIAGNOSIS — R06.09: Primary | ICD-10-CM

## 2023-10-10 PROCEDURE — 93017 CV STRESS TEST TRACING ONLY: CPT

## 2023-10-10 PROCEDURE — 78452 HT MUSCLE IMAGE SPECT MULT: CPT

## 2023-10-11 NOTE — STRESS TEST
DATE OF SERVICE: 10/10/2023



RESTING AND POST REGADENOSON TECHNETIUM-99M TETROFOSMIN SPECT CT IMAGING



ORDERING PHYSICIAN:

Georgina Johnson MD; MA; TARAN; CARLENE;.



CLINICAL DIAGNOSIS:

Shortness of breath.



Baseline images were carried out after injection of 10.94 mCi of technetium-99m 

tetrofosmin.  This was followed by 0.4 mg regadenoson and 31.5 mCi of 

technetium-99m tetrofosmin for stress imaging.  The electrocardiogram showed 

sinus rhythm with nonspecific ST abnormality at baseline.  It did not change 

significantly with regadenoson infusion.  The patient tolerated the procedure 

well.  Review of images at rest and following stress did not indicate any 

distinct perfusion defects consistent with significant myocardial ischemia or 

infarction.  Gated images show normal global left ventricular systolic function 

with normal regional wall motion.  Left ventricular ejection fraction is 

calculated to be 64%.



CONCLUSIONS:

1.  No evidence of any significant myocardial ischemia or infarction on this 

study.

2.  Normal regional wall motion.

3.  Normal global left ventricular systolic function with a calculated ejection 

fraction of 64%.





Job ID: [id number]

DocumentID: [id number]

Dictated Date: 10/11/2023 13:08:47

Transcription Date: 10/11/2023 16:08:00

Dictated By: GEORGINA JOHNSON MD; MA; TARAN; CARLENE;